# Patient Record
Sex: FEMALE | Race: BLACK OR AFRICAN AMERICAN
[De-identification: names, ages, dates, MRNs, and addresses within clinical notes are randomized per-mention and may not be internally consistent; named-entity substitution may affect disease eponyms.]

---

## 2017-11-18 ENCOUNTER — HOSPITAL ENCOUNTER (EMERGENCY)
Dept: HOSPITAL 24 - ER | Age: 52
Discharge: HOME | End: 2017-11-18
Payer: MEDICAID

## 2017-11-18 VITALS — DIASTOLIC BLOOD PRESSURE: 72 MMHG | SYSTOLIC BLOOD PRESSURE: 166 MMHG

## 2017-11-18 VITALS — BODY MASS INDEX: 34.3 KG/M2

## 2017-11-18 DIAGNOSIS — R10.84: ICD-10-CM

## 2017-11-18 DIAGNOSIS — A08.4: Primary | ICD-10-CM

## 2017-11-18 LAB
ALBUMIN SERPL BCP-MCNC: 3.7 G/DL (ref 3.4–5)
ALP SERPL-CCNC: 114 UNITS/L (ref 46–116)
ALT SERPL W P-5'-P-CCNC: 17 UNITS/L (ref 12–78)
AMYLASE SERPL-CCNC: 88 UNITS/L (ref 25–115)
AST SERPL W P-5'-P-CCNC: 13 UNITS/L (ref 15–37)
BASOPHILS # BLD AUTO: 0.1 X10^3/UL (ref 0–0.1)
BASOPHILS NFR BLD AUTO: 0.7 % (ref 0.2–1)
BUN SERPL-MCNC: 38 MG/DL (ref 7–18)
C PARVUM AG STL QL IA: NEGATIVE
CALCIUM ALBUM COR SERPL-SCNC: (no result) MG/DL (ref 8.5–10.1)
CALCIUM ALBUM COR SERPL-SCNC: 145 MMOL/L (ref 136–145)
CALCIUM SERPL-MCNC: 9.3 MG/DL (ref 8.5–10.1)
CHLORIDE SERPL-SCNC: 106 MMOL/L (ref 98–107)
CO2 SERPL-SCNC: 23.6 MMOL/L (ref 21–32)
CREAT SERPL-MCNC: 1.4 MG/DL (ref 0.55–1.02)
EGFR  BLACK RACES: 50 (ref 60–?)
EOSINOPHIL # BLD AUTO: 0 X10^3/UL (ref 0–0.2)
EOSINOPHIL NFR BLD AUTO: 0.3 % (ref 0.9–2.9)
ERYTHROCYTE [DISTWIDTH] IN BLOOD BY AUTOMATED COUNT: 14.5 % (ref 11.6–16.5)
G LAMBLIA AG STL QL IA: NEGATIVE
HCT VFR BLD AUTO: 44.4 % (ref 36–47)
HGB BLD-MCNC: 14.4 G/DL (ref 12–16)
LIPASE SERPL-CCNC: 264 UNITS/L (ref 73–393)
LYMPHOCYTES # BLD AUTO: 2.3 X10^3/UL (ref 1.3–2.9)
LYMPHOCYTES NFR BLD AUTO: 16.9 % (ref 21–51)
MCH RBC QN AUTO: 25.5 PG (ref 27–34)
MCHC RBC AUTO-ENTMCNC: 32.5 G/DL (ref 33–35)
MCV RBC AUTO: 78.6 FL (ref 80–100)
MONOCYTES # BLD AUTO: 0.6 X10^3/UL (ref 0.3–0.8)
MONOCYTES NFR BLD AUTO: 4.2 % (ref 0–13)
NEUTROPHILS # BLD AUTO: 10.6 X10^3/UL (ref 2.2–4.8)
NEUTROPHILS NFR BLD AUTO: 77.9 % (ref 42–75)
PLAT MORPH BLD: NORMAL
PLATELET # BLD: 195 X10^3/UL (ref 150–450)
PMV BLD AUTO: 10.5 FL (ref 7.4–11)
PROT SERPL-MCNC: 8.1 G/DL (ref 6.4–8.2)
RBC # BLD AUTO: 5.65 X10^6/UL (ref 3.5–5.4)
RBC MORPH BLD: (no result)
RBC MORPH BLD: SLIGHT
SODIUM SERPL-SCNC: 142 MMOL/L (ref 136–145)
STOOL FOR WBC: NEGATIVE
WBC NRBC COR # BLD AUTO: 13.6 X10^3/UL (ref 3.6–10)

## 2017-11-18 PROCEDURE — 96374 THER/PROPH/DIAG INJ IV PUSH: CPT

## 2017-11-18 PROCEDURE — 96375 TX/PRO/DX INJ NEW DRUG ADDON: CPT

## 2017-11-18 PROCEDURE — 87899 AGENT NOS ASSAY W/OPTIC: CPT

## 2017-11-18 PROCEDURE — 82150 ASSAY OF AMYLASE: CPT

## 2017-11-18 PROCEDURE — 74176 CT ABD & PELVIS W/O CONTRAST: CPT

## 2017-11-18 PROCEDURE — 36415 COLL VENOUS BLD VENIPUNCTURE: CPT

## 2017-11-18 PROCEDURE — 96367 TX/PROPH/DG ADDL SEQ IV INF: CPT

## 2017-11-18 PROCEDURE — 83630 LACTOFERRIN FECAL (QUAL): CPT

## 2017-11-18 PROCEDURE — 87336 ENTAMOEB HIST DISPR AG IA: CPT

## 2017-11-18 PROCEDURE — 87328 CRYPTOSPORIDIUM AG IA: CPT

## 2017-11-18 PROCEDURE — 83690 ASSAY OF LIPASE: CPT

## 2017-11-18 PROCEDURE — 99283 EMERGENCY DEPT VISIT LOW MDM: CPT

## 2017-11-18 PROCEDURE — 87427 SHIGA-LIKE TOXIN AG IA: CPT

## 2017-11-18 PROCEDURE — 99282 EMERGENCY DEPT VISIT SF MDM: CPT

## 2017-11-18 PROCEDURE — 80053 COMPREHEN METABOLIC PANEL: CPT

## 2017-11-18 PROCEDURE — 96365 THER/PROPH/DIAG IV INF INIT: CPT

## 2017-11-18 PROCEDURE — 87329 GIARDIA AG IA: CPT

## 2017-11-18 PROCEDURE — 85025 COMPLETE CBC W/AUTO DIFF WBC: CPT

## 2017-11-18 PROCEDURE — 74022 RADEX COMPL AQT ABD SERIES: CPT

## 2017-11-18 PROCEDURE — 87045 FECES CULTURE AEROBIC BACT: CPT

## 2017-11-18 PROCEDURE — 87493 C DIFF AMPLIFIED PROBE: CPT

## 2017-11-18 NOTE — CT
HISTORY: Nausea/vomiting/diarrhea/mid epigastric pain. Prior history of hypertension, diabetes, COPD,
 asthma and cholecystectomy.



Study: CT abdomen and pelvis without IV or oral contrast



Comparison: 05/09/2014.



Technique:



Multiple axial images of the abdomen and pelvis were obtained from the lung bases to the pubic symphy
sis without the administration of IV or oral contrast.  Coronal and sagittal images are also reviewed
. Dose reduction techniques utilized automatic exposure control.



Findings:



The visualized portions of the lung bases are unremarkable.  The liver, spleen, pancreas, kidneys, an
d adrenal glands are unremarkable in their CT appearance. The gallbladder surgically absent..  No sig
nificant mesenteric lymphadenopathy or stranding can be observed.  No free fluid or free air is seen 
within the abdomen.  No bowel wall thickening or bowel dilatation is present. The appendix is normal.
 The colon is unremarkable.  Specifically, there is no diverticulosis noted within the sigmoid colon.
 Uterus and ovaries are not identified and may be surgically absent also. The urinary bladder is zenon
sly unremarkable.  There is again very mild sclerosis along the upper endplate at L4. This is unchang
ed from prior studies.



IMPRESSION:

 

No acute abnormality seen.









Reported By:Electronically Signed by JUANA PEREZ MD at 11/18/2017 2:39:13 PM

## 2017-11-18 NOTE — DR.NAUSEAF
HPI





- Reviewed


Nurses Notes Reviewed: Yes





- Source


History Provided: Parent





- Mode of Arrival


Mode of Arrival: Stretcher





- Context


Onset: Spontaneous


Recent: None


History of: Diabetes





- Quality


Quality: Bilious





- Associated Signs and Symptoms


Abdominal Pain Quality: Cramping


Abdominal Pain Location: Diffuse


Symptoms: Abdominal Pain, Diarrhea, Anorexia





PMH





- PMH


Past Medical History: Asthma, Diabetes, GERD, Hypertension, Sleep Apnea


Past Surgical History: Yes


Surgical History: Hysterectomy





- Family History


Family Medical History: Diabetes Mellitus, Coronary Artery Disease, Hypertension





- Social History


Do you use any recreational Drugs:: No





ROS





- Review of Systems


Constitutional: Weakness, Fatigue, Loss of Appetite.  negative: Chills


Eyes: No Symptoms Reported.  negative: Eye Pain, Discharge


ENTM: Ear Pain, Nose Discharge, Nose Congestion, Throat Pain


Respiratoy: Moist Cough, Wheezing


Cardiovascular: No Symptoms Reported


Gastrointestinal/Abdominal: Vomiting (MAINLY MUCOUS)


Genitourinary: No Symptoms Reported


Neurological: No Symptoms Reported


Musculoskeletal: No Symptoms Reported


Integumentary: No Symptoms Reported


Hematologic/Lymphatic: No Symptoms Reported


Endocrine: No Symptoms Reported


All Other Systems: Reviewed and Negative





PE





- Vital Signs


Vitals: 


 





Temperature                      96.9 F


Pulse Rate [Left Brachial]       55


Pulse Rate                       82


Respiratory Rate                 18


Blood Pressure [Right Arm]       166/72


Blood Pressure [Left Arm]        181/83


Blood Pressure                   146/72


O2 Sat by Pulse Oximetry         100











- General


Limitations: No Limitations


General Appearance: Alert





- Head


Head Exam: Normal Inspection





- Eyes


Eye exam: Normal Appearance





- ENT


ENT Exam: Normal  External Ear Exam





- Neck


Neck Exam: Trachea Midline





- Chest


Chest Inspection: Symmetric Chest Wall Rise





- Respiratory


Respiratory Exam: Normal Lung Sounds Bilat


Respiratory Exam: Bilateral Clear to Auscultation





- Cardiovascular


Cardiovascular Exam: Regular Rate, Normal Rhythm, Normal Heart Sounds





- Abdominal Exam


Abdominal Exam: Normal Bowel Sounds, Soft, Tenderness


Abdominal Tenderness: Diffuse, Moderate





- Rectal


Rectal Exam: Deferred





- 


External  Exam: Female: Deferred


: Speculum Exam  (Female): Deferred


: Bimanual Exam (female): Deferred





- Extremities


Extremities Exam: Normal Inspection





- Back


Back Exam: Normal Inspection





- Neurologic


Neurological Exam: Alert, Oriented X3





- Psychiatric


Psychiatric Exam: Normal Affect, Normal Mood





- Skin


Skin Exam: Normal Color





ROR





- Labs Reviewed


Result Diagrams: 


 11/18/17 10:00





 11/18/17 10:00


Laboratory: 


 





11/18/17 09:53   Stool    - Final





 











WBC  13.6 X10^3/uL (3.6-10.0)  H  11/18/17  10:00    


 


RBC  5.65 X10^6/uL (3.5-5.4)  H  11/18/17  10:00    


 


Hgb  14.4 g/dL (12.0-16.0)   11/18/17  10:00    


 


Hct  44.4 % (36.0-47.0)   11/18/17  10:00    


 


MCV  78.6 fL (80.0-100.0)  L  11/18/17  10:00    


 


MCH  25.5 pg (27.0-34.0)  L  11/18/17  10:00    


 


MCHC  32.5 g/dL (33.0-35.0)  L  11/18/17  10:00    


 


RDW  14.5 % (11.6-16.5)   11/18/17  10:00    


 


Plt Count  195 X10^3/uL (150.0-450.0)   11/18/17  10:00    


 


Plt Count Comment  Adequate  (ADEQUATE)   11/18/17  10:00    


 


MPV  10.5 fL (7.4-11.0)   11/18/17  10:00    


 


Neut %  77.9 % (42.0-75.0)  H  11/18/17  10:00    


 


Lymph %  16.9 % (21.0-51.0)  L  11/18/17  10:00    


 


Mono %  4.2 % (0.0-13.0)   11/18/17  10:00    


 


Eos %  0.3 % (0.9-2.9)  L  11/18/17  10:00    


 


Baso %  0.7 % (0.2-1.0)   11/18/17  10:00    


 


Neut #  10.6 x10^3/uL (2.2-4.8)  H  11/18/17  10:00    


 


Lymph #  2.3 X10^3/uL (1.3-2.9)   11/18/17  10:00    


 


Mono #  0.6 x10^3/uL (0.3-0.8)   11/18/17  10:00    


 


Eos #  0.0 x10^3/uL (0.0-0.2)   11/18/17  10:00    


 


Baso #  0.1 X10^3/uL (0.0-0.1)   11/18/17  10:00    


 


Absolute Nucleated RBC  0.0 /100WBC  11/18/17  10:00    


 


Plt Morphology Comment  Normal  (NORMAL)   11/18/17  10:00    


 


RBC Morphology  Abnormal  (NORMAL)  A  11/18/17  10:00    


 


Microcytosis  Slight  A  11/18/17  10:00    


 


Sodium  142 mmol/L (136-145)   11/18/17  10:00    


 


Corrected Sodium  145 mmol/L (136-145)   11/18/17  10:00    


 


Potassium  3.2 mmol/L (3.5-5.1)  L  11/18/17  10:00    


 


Chloride  106 mmol/L ()   11/18/17  10:00    


 


Carbon Dioxide  23.6 mmol/L (21-32)   11/18/17  10:00    


 


BUN  38 mg/dL (7-18)  H  11/18/17  10:00    


 


Creatinine  1.40 mg/dL (0.55-1.02)  H  11/18/17  10:00    


 


Est GFR (MDRD) Af Amer  50  (>60)  L  11/18/17  10:00    


 


Est GFR (MDRD) Non-Af  42  (>60)  L  11/18/17  10:00    


 


Glucose  219 mg/dL (65-99)  H  11/18/17  10:00    


 


Calcium  9.3 mg/dL (8.5-10.1)   11/18/17  10:00    


 


Corrected Calcium  TNP   11/18/17  10:00    


 


Total Bilirubin  0.50 mg/dL (0.2-1.0)   11/18/17  10:00    


 


AST  13 Units/L (15-37)  L  11/18/17  10:00    


 


ALT  17 Units/L (12-78)   11/18/17  10:00    


 


Alkaline Phosphatase  114 Units/L ()   11/18/17  10:00    


 


Total Protein  8.1 g/dL (6.4-8.2)   11/18/17  10:00    


 


Albumin  3.7 g/dL (3.4-5.0)   11/18/17  10:00    


 


Globulin  4.4 g/dL (2.5-4.5)   11/18/17  10:00    


 


Albumin/Globulin Ratio  0.8 Ratio (1.1-2.1)  L  11/18/17  10:00    


 


Amylase  88 Units/L ()   11/18/17  10:00    


 


Lipase  264 Units/L ()   11/18/17  10:00    


 


Stool Description  4g,loose,yellow   11/18/17  09:53    


 


Stool for White Cells  Negative  (NEGATIVE)   11/18/17  09:53    


 


Stl C. diff Tox B Gene  Negative  (NEGATIVE)   11/18/17  09:53    


 


Stl C. diff 027-NAP1-BI  Negative  (NEGATIVE)   11/18/17  09:53    


 


Cryptosporid parvum Ag  Negative  (NEGATIVE)   11/18/17  09:53    


 


E. histolytica Antigen  Negative  (NEGATIVE)   11/18/17  09:53    


 


Giardia lamblia Ag  Negative  (NEGATIVE)   11/18/17  09:53    














- Discharge Plan


Disposition: 01 HOME, SELF-CARE


Condition: Stable


Prescriptions: 


Diphenoxylate/Atropine [Lomotil] 1 tab PO TID PRN #21 tab


 PRN Reason: 


Ondansetron [Zofran ODT 8 mg] 8 mg PO Q8H PRN #12 tab


 PRN Reason: Nausea/Vomiting


Promethazine HCl [PHENERGAN TAB 25 MG *] 25 mg PO Q8H PRN #12 tab


 PRN Reason: Nausea/Vomiting





- Follow ups/Referrals


Follow ups/Referrals: 


[facility],None [Primary Care Provider] - 11/20/17





- Instructions


Instructions:  Viral Gastroenteritis, Adult, Easy-to-Read, Abdominal Pain, Adult

, Easy-to-Read


Additional Instructions: 


RETURN TO ED IF WORSE.

## 2017-11-18 NOTE — RAD
Examination: Abdomen series with PA chest



History: Nausea and vomiting, diabetes



Comparison reference: 06/14/2015



Findings: PA chest: Normal heart size with clear lungs and pleural spaces. There is no evidence for p
leural fluid or pneumoperitoneum. There are surgical clips in the neck. In the abdomen, supine and up
right views demonstrate scattered segments of gas and fluid distended small bowel and colon. A few sh
ort air-fluid levels are seen. There is no evidence for ascites, mass or pneumoperitoneum. There are 
surgical clips in the right upper abdomen.



Impression:



1. Normal PA chest.



2. Intestinal gas pattern most consistent with a reflex ileus. However, follow-up imaging recommended
 if developing intestinal ischemia or obstruction is a clinical concern.



Reported By:Electronically Signed by CLARKE MALAGON MD at 11/18/2017 12:50:47 PM

## 2017-12-21 ENCOUNTER — HOSPITAL ENCOUNTER (EMERGENCY)
Dept: HOSPITAL 24 - ER | Age: 52
LOS: 1 days | Discharge: HOME | End: 2017-12-22
Payer: MEDICAID

## 2017-12-21 VITALS — BODY MASS INDEX: 41.4 KG/M2

## 2017-12-21 DIAGNOSIS — R11.0: ICD-10-CM

## 2017-12-21 DIAGNOSIS — R10.84: ICD-10-CM

## 2017-12-21 DIAGNOSIS — K52.89: Primary | ICD-10-CM

## 2017-12-21 LAB
ALBUMIN SERPL BCP-MCNC: 3.8 G/DL (ref 3.4–5)
ALP SERPL-CCNC: 122 UNITS/L (ref 46–116)
ALT SERPL W P-5'-P-CCNC: 24 UNITS/L (ref 12–78)
AMYLASE SERPL-CCNC: 61 UNITS/L (ref 25–115)
AST SERPL W P-5'-P-CCNC: 18 UNITS/L (ref 15–37)
BASOPHILS # BLD AUTO: 0.1 X10^3/UL (ref 0–0.1)
BASOPHILS NFR BLD AUTO: 0.7 % (ref 0.2–1)
BUN SERPL-MCNC: 20 MG/DL (ref 7–18)
C PARVUM AG STL QL IA: NEGATIVE
CALCIUM ALBUM COR SERPL-SCNC: (no result) MG/DL (ref 8.5–10.1)
CALCIUM ALBUM COR SERPL-SCNC: 144 MMOL/L (ref 136–145)
CALCIUM SERPL-MCNC: 9 MG/DL (ref 8.5–10.1)
CHLORIDE SERPL-SCNC: 107 MMOL/L (ref 98–107)
CO2 SERPL-SCNC: 24.4 MMOL/L (ref 21–32)
CREAT SERPL-MCNC: 1.05 MG/DL (ref 0.55–1.02)
EGFR  BLACK RACES: > 60 (ref 60–?)
EOSINOPHIL # BLD AUTO: 0.1 X10^3/UL (ref 0–0.2)
EOSINOPHIL NFR BLD AUTO: 0.5 % (ref 0.9–2.9)
ERYTHROCYTE [DISTWIDTH] IN BLOOD BY AUTOMATED COUNT: 14.5 % (ref 11.6–16.5)
G LAMBLIA AG STL QL IA: NEGATIVE
HCT VFR BLD AUTO: 45.7 % (ref 36–47)
HGB BLD-MCNC: 14.8 G/DL (ref 12–16)
LIPASE SERPL-CCNC: 129 UNITS/L (ref 73–393)
LYMPHOCYTES # BLD AUTO: 2.9 X10^3/UL (ref 1.3–2.9)
LYMPHOCYTES NFR BLD AUTO: 20.2 % (ref 21–51)
MCH RBC QN AUTO: 25.6 PG (ref 27–34)
MCHC RBC AUTO-ENTMCNC: 32.4 G/DL (ref 33–35)
MCV RBC AUTO: 79 FL (ref 80–100)
MONOCYTES # BLD AUTO: 0.6 X10^3/UL (ref 0.3–0.8)
MONOCYTES NFR BLD AUTO: 4.3 % (ref 0–13)
NEUTROPHILS # BLD AUTO: 10.8 X10^3/UL (ref 2.2–4.8)
NEUTROPHILS NFR BLD AUTO: 74.3 % (ref 42–75)
PLAT MORPH BLD: NORMAL
PLATELET # BLD: 261 X10^3/UL (ref 150–450)
PMV BLD AUTO: 10 FL (ref 7.4–11)
PROT SERPL-MCNC: 8.4 G/DL (ref 6.4–8.2)
RBC # BLD AUTO: 5.78 X10^6/UL (ref 3.5–5.4)
RBC MORPH BLD: (no result)
RBC MORPH BLD: PRESENT
RBC MORPH BLD: SLIGHT
RBC MORPH BLD: SLIGHT
SODIUM SERPL-SCNC: 143 MMOL/L (ref 136–145)
STOOL FOR WBC: NEGATIVE
WBC NRBC COR # BLD AUTO: 14.5 X10^3/UL (ref 3.6–10)

## 2017-12-21 PROCEDURE — 83630 LACTOFERRIN FECAL (QUAL): CPT

## 2017-12-21 PROCEDURE — 99283 EMERGENCY DEPT VISIT LOW MDM: CPT

## 2017-12-21 PROCEDURE — 96374 THER/PROPH/DIAG INJ IV PUSH: CPT

## 2017-12-21 PROCEDURE — 96365 THER/PROPH/DIAG IV INF INIT: CPT

## 2017-12-21 PROCEDURE — 36415 COLL VENOUS BLD VENIPUNCTURE: CPT

## 2017-12-21 PROCEDURE — 81001 URINALYSIS AUTO W/SCOPE: CPT

## 2017-12-21 PROCEDURE — 87899 AGENT NOS ASSAY W/OPTIC: CPT

## 2017-12-21 PROCEDURE — 85025 COMPLETE CBC W/AUTO DIFF WBC: CPT

## 2017-12-21 PROCEDURE — 87045 FECES CULTURE AEROBIC BACT: CPT

## 2017-12-21 PROCEDURE — 96367 TX/PROPH/DG ADDL SEQ IV INF: CPT

## 2017-12-21 PROCEDURE — 80053 COMPREHEN METABOLIC PANEL: CPT

## 2017-12-21 PROCEDURE — 74177 CT ABD & PELVIS W/CONTRAST: CPT

## 2017-12-21 PROCEDURE — 96375 TX/PRO/DX INJ NEW DRUG ADDON: CPT

## 2017-12-21 PROCEDURE — 87328 CRYPTOSPORIDIUM AG IA: CPT

## 2017-12-21 PROCEDURE — 74022 RADEX COMPL AQT ABD SERIES: CPT

## 2017-12-21 PROCEDURE — 82150 ASSAY OF AMYLASE: CPT

## 2017-12-21 PROCEDURE — 83690 ASSAY OF LIPASE: CPT

## 2017-12-21 PROCEDURE — 87336 ENTAMOEB HIST DISPR AG IA: CPT

## 2017-12-21 PROCEDURE — 87329 GIARDIA AG IA: CPT

## 2017-12-21 PROCEDURE — 99282 EMERGENCY DEPT VISIT SF MDM: CPT

## 2017-12-21 PROCEDURE — 87493 C DIFF AMPLIFIED PROBE: CPT

## 2017-12-21 PROCEDURE — 87427 SHIGA-LIKE TOXIN AG IA: CPT

## 2017-12-21 NOTE — RAD
ACUTE ABDOMINAL SERIES     



CLINICAL HISTORY: 52-year-old female with nausea, vomiting and diarrhea since Tuesday with right flan
k pain. Patient recently had EGD and colonoscopy.



COMPARISON: None.



FINDINGS: 



PA chest radiograph demonstrates normal cardiopericardial silhouette. There is no focal consolidation
, pleural effusion or pneumothorax. Pulmonary vascularity is normal.



Abdominal radiographs demonstrate multiple dilated air-filled loops of small bowel within the left up
per quadrant measuring up to 5.7 cm with air-fluid levels on upright imaging. Gas and stool are seen 
throughout the colon. There is no small bowel distention. There is no radiographic evidence of pneumo
peritoneum.



Imaged osseous structures are intact. Soft tissues are unremarkable.



IMPRESSION: 



1. No acute cardiopulmonary process.

2. Findings concerning for small bowel obstruction, CT abdomen and pelvis with contrast is recommende
d for complete evaluation.





Reported By:Electronically Signed by JERED CARTER MD at 12/21/2017 10:12:19 PM

## 2017-12-21 NOTE — DR.GENAD
HPI





- PCP


Primary Care Physician: aury





- HPI Comment


HPI Comment: PATIENT HAD EGD AND COLONOSCOPY DONE RECENTLY. WAS GIVEN CARAFATE 

AND CHOLESTRAMINE WHICH IS MAKING HER FEEL SICK TO HER STOMACH. PAIN IN RT 

FLANK AREA AS WELL. HAVING WATERY DIARRHEA. NO FEVER.





- Complaint/Symptoms


Chief Complaint Doctors Comments: N/V/D AND ANDOMINAL AND LOWER RIB PAIN TIMES 

SEVERAL DAYS.


Chief Complaint:: n/v/d since tuesday, rt flank pain since last night. i had a 

egd and colonoscopy done day before thanksgiving and the monday after and they 

gave meds to take said something with wrong with the layer of my colon. the 

meds are just making me sicker. i need something for this throwing up. 

everytime i take a breath my ribs are killling me bc of all this throwing up.


Self Treatment fo Chief Complaint: carafate susp 1 gram/10cc 1 tsp 30 mins 

before meals and cholestryramine susp





- Nurses notes reviewed


Nurses Notes Review: Yes





- Source


History Provided: Patient





- Mode of Arrival


Mode of Arrival: Ambulatory





- Timing


Onset of Chief Complaint: 12/19/17


Came on: Gradually





- Duration


Duration: Constant


Duration: Days





- Severity


Severity: Moderate





PMH





- PMH


Past Medical History: Yes


Past Medical History: Asthma, Diabetes, GERD, Hypertension, Sleep Apnea


Past Surgical History: Yes


Surgical History: Hysterectomy





- Family History


History of Family Medical Conditions: Yes


Family Medical History: Diabetes Mellitus, Coronary Artery Disease, Hypertension





- Social History


Does patient currently use any type of tobacco product: No


Have you used tobacco products in the last 12 months: No


Type of Tobacco Use: None


Does any household member use tobacco: No


Alcohol Use: None


Do you use any recreational Drugs:: No


Lives With: Family


Lives Where: Home





- infectious screening


Have you traveled outside the country in the last 6 months?: No


Isolation: Standard





ROS





- Review of Systems


Constitutional: Weakness, Fatigue, Loss of Appetite.  negative: Chills, 

Diaphoresis


Eyes: No Symptoms Reported.  negative: Eye Pain, Discharge


ENTM: No Symptoms Reported.  negative: Ear Pain, Nose Discharge, Nose Congestion

, Throat Pain


Respiratoy: No Symptoms Reported.  negative: Productive Cough, Short of Breath, 

Wheezing, Hemoptysis


Cardiovascular: No Symptoms Reported


Gastrointestinal/Abdominal: Abdominal Pain, Diarrhea, Nausea, Vomiting


Genitourinary: No Symptoms Reported


Neurological: Weakness, Dizziness


Musculoskeletal: Muscle Pain


Integumentary: No Symptoms Reported


Hematologic/Lymphatic: No Symptoms Reported


Endocrine: No Symptoms Reported


All Other Systems: Reviewed and Negative





PE





- Vital Signs


Vitals: 


 





Temperature                      97.1 F


Pulse Rate [Right Brachial]      72


Pulse Rate                       92


Respiratory Rate                 18


Blood Pressure [Right Arm]       128/65


Blood Pressure [Left Arm]        181/83


Blood Pressure                   175/90


O2 Sat by Pulse Oximetry         98











- General


Limitations: No Limitations


General Appearance: Alert





- Head


Head Exam: Normal Inspection





- Eyes


Eye exam: Normal Appearance





- ENT


ENT Exam: Normal  External Ear Exam


External Ear Exam: Normal External Inspection


TM/Canal Exam: Bilateral Normal


Nose Exam: Normal Nose Exam


Mouth Exam: Normal Inspection


Throat Exam: Normal Inspection





- Neck


Neck Exam: Trachea Midline





- Chest


Chest Inspection: Symmetric Chest Wall Rise





- Respiratory


Respiratory Exam: Normal Lung Sounds Bilat


Respiratory Exam: Bilateral Rhonchi, Lower Rhonchi





- Cardiovascular


Cardiovascular Exam: Regular Rate, Normal Rhythm, Normal Heart Sounds





- Abdominal Exam


Abdominal Exam: Normal Bowel Sounds, Soft, Tenderness


Abdominal Tenderness: Diffuse, Moderate





- Extremities


Extremities Exam: Normal Inspection





- Back


Back Exam: Normal Inspection





- Neurologic


Neurological Exam: Alert, Oriented X3





- Psychiatric


Psychiatric Exam: Anxious





- Skin


Skin Exam: Normal Color





MDM





- Differential Diagnosis


Differential Diagnosis: ABD PAIN, BOWEL OBTRUCTION, UTI, KIDNEY STONE





Course





- Treatment


Treatment: SEE ORDERS.





- Education/Counseling


Education/Counseling: Patient, Education


Educated On: Diagnosis, Needs for Follow Up





ROR





- Labs Reviewed


Laboratory Results Reviewed?: Yes


Result Diagrams: 


 12/21/17 20:16





 12/21/17 20:16


Laboratory: 


 





12/21/17 20:34   Stool   Stool Culture - Final


12/21/17 20:34   Stool    - Final





 











WBC  14.5 X10^3/uL (3.6-10.0)  H  12/21/17  20:16    


 


RBC  5.78 X10^6/uL (3.5-5.4)  H  12/21/17  20:16    


 


Hgb  14.8 g/dL (12.0-16.0)   12/21/17  20:16    


 


Hct  45.7 % (36.0-47.0)   12/21/17  20:16    


 


MCV  79.0 fL (80.0-100.0)  L  12/21/17  20:16    


 


MCH  25.6 pg (27.0-34.0)  L  12/21/17  20:16    


 


MCHC  32.4 g/dL (33.0-35.0)  L  12/21/17  20:16    


 


RDW  14.5 % (11.6-16.5)   12/21/17  20:16    


 


Plt Count  261 X10^3/uL (150.0-450.0)   12/21/17  20:16    


 


Plt Count Comment  Adequate  (ADEQUATE)   12/21/17  20:16    


 


MPV  10.0 fL (7.4-11.0)   12/21/17  20:16    


 


Neut %  74.3 % (42.0-75.0)   12/21/17  20:16    


 


Lymph %  20.2 % (21.0-51.0)  L  12/21/17  20:16    


 


Mono %  4.3 % (0.0-13.0)   12/21/17  20:16    


 


Eos %  0.5 % (0.9-2.9)  L  12/21/17  20:16    


 


Baso %  0.7 % (0.2-1.0)   12/21/17  20:16    


 


Neut #  10.8 x10^3/uL (2.2-4.8)  H  12/21/17  20:16    


 


Lymph #  2.9 X10^3/uL (1.3-2.9)   12/21/17  20:16    


 


Mono #  0.6 x10^3/uL (0.3-0.8)   12/21/17  20:16    


 


Eos #  0.1 x10^3/uL (0.0-0.2)   12/21/17  20:16    


 


Baso #  0.1 X10^3/uL (0.0-0.1)   12/21/17  20:16    


 


Absolute Nucleated RBC  0.0 /100WBC  12/21/17  20:16    


 


Plt Morphology Comment  Normal  (NORMAL)   12/21/17  20:16    


 


RBC Morphology  Abnormal  (NORMAL)  A  12/21/17  20:16    


 


Hypochromasia  Slight  A  12/21/17  20:16    


 


Poikilocytosis  Slight  A  12/21/17  20:16    


 


Darline Cells  Present   12/21/17  20:16    


 


Sodium  143 mmol/L (136-145)   12/21/17  20:16    


 


Corrected Sodium  144 mmol/L (136-145)   12/21/17  20:16    


 


Potassium  3.2 mmol/L (3.5-5.1)  L  12/21/17  20:16    


 


Chloride  107 mmol/L ()   12/21/17  20:16    


 


Carbon Dioxide  24.4 mmol/L (21-32)   12/21/17  20:16    


 


BUN  20 mg/dL (7-18)  H  12/21/17  20:16    


 


Creatinine  1.05 mg/dL (0.55-1.02)  H  12/21/17  20:16    


 


Est GFR (MDRD) Af Amer  > 60  (>60)   12/21/17  20:16    


 


Est GFR (MDRD) Non-Af  58  (>60)  L  12/21/17  20:16    


 


Glucose  121 mg/dL (65-99)  H  12/21/17  20:16    


 


Calcium  9.0 mg/dL (8.5-10.1)   12/21/17  20:16    


 


Corrected Calcium  TNP   12/21/17  20:16    


 


Total Bilirubin  0.40 mg/dL (0.2-1.0)   12/21/17  20:16    


 


AST  18 Units/L (15-37)   12/21/17  20:16    


 


ALT  24 Units/L (12-78)   12/21/17  20:16    


 


Alkaline Phosphatase  122 Units/L ()  H  12/21/17  20:16    


 


Total Protein  8.4 g/dL (6.4-8.2)  H  12/21/17  20:16    


 


Albumin  3.8 g/dL (3.4-5.0)   12/21/17  20:16    


 


Globulin  4.6 g/dL (2.5-4.5)  H  12/21/17  20:16    


 


Albumin/Globulin Ratio  0.8 Ratio (1.1-2.1)  L  12/21/17  20:16    


 


Amylase  61 Units/L ()   12/21/17  20:16    


 


Lipase  129 Units/L ()   12/21/17  20:16    


 


Specimen Type  Clean catch urine   12/22/17  01:20    


 


Urine Color  Yellow  (YELLOW)   12/22/17  01:20    


 


Urine Appearance  Clear  (CLEAR)   12/22/17  01:20    


 


Urine pH  5.0  (5.0 - 8.0)   12/22/17  01:20    


 


Ur Specific Gravity  1.015  (1.000-1.030)   12/22/17  01:20    


 


Urine Protein  2+  (NEGATIVE)   12/22/17  01:20    


 


Urine Glucose (UA)  Negative  (NEGATIVE)   12/22/17  01:20    


 


Urine Ketones  Negative  (NEGATIVE)   12/22/17  01:20    


 


Urine Occult Blood  Negative  (NEGATIVE)   12/22/17  01:20    


 


Urine Nitrite  Negative  (NEGATIVE)   12/22/17  01:20    


 


Urine Bilirubin  Negative  (NEGATIVE)   12/22/17  01:20    


 


Urine Urobilinogen  Normal  (NORMAL)   12/22/17  01:20    


 


Ur Leukocyte Esterase  Negative  (NEGATIVE)   12/22/17  01:20    


 


Urine RBC  Rare /HPF (NEGATIVE)   12/22/17  01:20    


 


Urine WBC  None seen /HPF (NEGATIVE)   12/22/17  01:20    


 


Ur Squamous Epith Cells  Rare /HPF (NEGATIVE)   12/22/17  01:20    


 


Urine Bacteria  Negative /HPF (NEGATIVE)   12/22/17  01:20    


 


Hyaline Casts  Few /LPF (NEGATIVE)   12/22/17  01:20    


 


Urine Mucus  Few /HPF (NEGATIVE)   12/22/17  01:20    


 


Ur Culture Indicated?  No/not indicated   12/22/17  01:20    


 


Stool Description  75 g. tan/liquid   12/21/17  20:34    


 


Stool for White Cells  Negative  (NEGATIVE)   12/21/17  20:46    


 


Stl C. diff Tox B Gene  Negative  (NEGATIVE)   12/21/17  20:46    


 


Stl C. diff 027-NAP1-BI  Negative  (NEGATIVE)   12/21/17  20:46    


 


Cryptosporid parvum Ag  Negative  (NEGATIVE)   12/21/17  20:34    


 


E. histolytica Antigen  Negative  (NEGATIVE)   12/21/17  20:34    


 


Giardia lamblia Ag  Negative  (NEGATIVE)   12/21/17  20:34    














- XRAY


XRAY Interpreted by: Radiologist


XRAY Findings: REPORT DISCUSS WITH PATIENT.





- Diagnosis


Discharge Problem: 


 Gastroenteritis, Nausea





Abdominal pain


Qualifiers:


 Abdominal location: generalized Qualified Code(s): R10.84 - Generalized 

abdominal pain








- Discharge Plan


Disposition: 01 HOME, SELF-CARE


Condition: Stable


Prescriptions: 


Ciprofloxacin HCl [CIPRO 500 MG TAB *] 500 mg PO Q12H #20 tab


Ondansetron [Zofran ODT 8 mg] 8 mg PO Q8H PRN #15 tab


 PRN Reason: Nausea/Vomiting





- Follow ups/Referrals


Follow ups/Referrals: 


[facility],None [Primary Care Provider] - 3 days





- Instructions


Instructions:  Nausea, Adult, Viral Gastroenteritis, Adult, Easy-to-Read, 

Abdominal Pain, Adult, Easy-to-Read

## 2017-12-22 VITALS — DIASTOLIC BLOOD PRESSURE: 65 MMHG | SYSTOLIC BLOOD PRESSURE: 128 MMHG

## 2017-12-22 LAB
BACTERIA URNS QL MICRO: NEGATIVE /HPF
BILIRUB UR QL STRIP.AUTO: NEGATIVE
CLARITY UR: CLEAR
COLOR UR AUTO: YELLOW
GLUCOSE UR QL STRIP.AUTO: NEGATIVE
HYALINE CASTS URNS QL MICRO: (no result) /LPF
KETONES UR QL STRIP.AUTO: NEGATIVE
LEUKOCYTE ESTERASE UR QL STRIP.AUTO: NEGATIVE
MUCOUS THREADS #/AREA URNS HPF: (no result) /HPF
NITRITE UR QL STRIP.AUTO: NEGATIVE
PH UR STRIP.AUTO: 5 [PH] (ref 5–8)
PROT UR QL STRIP.AUTO: (no result)
RBC # UR STRIP.AUTO: NEGATIVE /UL
RBC #/AREA URNS HPF: (no result) /HPF
SP GR UR STRIP.AUTO: 1.01 (ref 1–1.03)
SQUAMOUS URNS QL MICRO: (no result) /HPF
UROBILINOGEN UR QL STRIP.AUTO: NORMAL

## 2017-12-22 NOTE — CT
CT abdomen and pelvis with contrast



Indication: Nausea and vomiting with right flank pain



Comparison: 11/18/17



Technique:



Multiple axial images of the abdomen and pelvis were obtained from the lung bases to the pubic symphy
sis after the administration of IV contrast.  



Findings:

Lung bases are clear. The liver demonstrates focal hypoattenuation/decreased enhancement within the m
edial aspect of the anterior pack segment adjacent to gallbladder fossa on axial image 27 similar kaylie
or examination dated 05/09/2014. No new hepatic lesion identified. Previous cholecystectomy is noted.
 Bile ducts are normal in caliber. The spleen, pancreas and adrenal glands are normal. Neither kidney
 demonstrates evidence of nephrolithiasis, hydronephrosis or mass. Upper GI tract demonstrates mild f
luid distention and mucosal enhancement within proximal mid loops of small bowel. There is no dilatat
ion or distal decompression identified to suggest obstruction. Urinary bladder is normal. No pelvic o
r adnexal mass. The rectum and colon demonstrate mild distal colonic diverticulosis without evidence 
acute diverticulitis. Mild increased fluid distention of the proximal colon. The appendix is normal. 
Abdominal aorta is normal in caliber. No enlarged abdominal or pelvic lymphadenopathy. Review of bone
 windows demonstrates no acute osseous abnormality.



Impression: 

1.Mild increased fluid distention and mucosal enhancement of the proximal mid small bowel with increa
sed fluid within the proximal colon most consistent with an acute enteritis and likely associated nikky
rrhea.



2. The appendix is normal.



3. Stable incidental findings as described above.







Reported By:Electronically Signed by PAULIE SEARS MD at 12/22/2017 1:33:48 AM

## 2018-01-20 ENCOUNTER — HOSPITAL ENCOUNTER (EMERGENCY)
Dept: HOSPITAL 24 - ER | Age: 53
Discharge: HOME | End: 2018-01-20
Payer: MEDICAID

## 2018-01-20 VITALS — SYSTOLIC BLOOD PRESSURE: 160 MMHG | DIASTOLIC BLOOD PRESSURE: 72 MMHG

## 2018-01-20 VITALS — BODY MASS INDEX: 43.4 KG/M2
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
## 2018-01-20 DIAGNOSIS — S70.12XA: Primary | ICD-10-CM
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
## 2018-01-20 LAB
BASOPHILS # BLD AUTO: 0.1 X10^3/UL (ref 0–0.1)
BASOPHILS NFR BLD AUTO: 1.5 % (ref 0.2–1)
BUN SERPL-MCNC: 19 MG/DL (ref 7–18)
CALCIUM ALBUM COR SERPL-SCNC: 144 MMOL/L (ref 136–145)
CALCIUM SERPL-MCNC: 9.1 MG/DL (ref 8.5–10.1)
CHLORIDE SERPL-SCNC: 108 MMOL/L (ref 98–107)
CO2 SERPL-SCNC: 24.9 MMOL/L (ref 21–32)
CREAT SERPL-MCNC: 0.74 MG/DL (ref 0.55–1.02)
EGFR  BLACK RACES: > 60 (ref 60–?)
EOSINOPHIL # BLD AUTO: 0.2 X10^3/UL (ref 0–0.2)
EOSINOPHIL NFR BLD AUTO: 1.8 % (ref 0.9–2.9)
ERYTHROCYTE [DISTWIDTH] IN BLOOD BY AUTOMATED COUNT: 14.8 % (ref 11.6–16.5)
HCT VFR BLD AUTO: 38.8 % (ref 36–47)
HGB BLD-MCNC: 12.8 G/DL (ref 12–16)
LYMPHOCYTES # BLD AUTO: 2.5 X10^3/UL (ref 1.3–2.9)
LYMPHOCYTES NFR BLD AUTO: 27.4 % (ref 21–51)
MCH RBC QN AUTO: 26.3 PG (ref 27–34)
MCHC RBC AUTO-ENTMCNC: 33 G/DL (ref 33–35)
MCV RBC AUTO: 79.7 FL (ref 80–100)
MONOCYTES # BLD AUTO: 0.4 X10^3/UL (ref 0.3–0.8)
MONOCYTES NFR BLD AUTO: 4.8 % (ref 0–13)
NEUTROPHILS # BLD AUTO: 5.9 X10^3/UL (ref 2.2–4.8)
NEUTROPHILS NFR BLD AUTO: 64.5 % (ref 42–75)
PLATELET # BLD: 215 X10^3/UL (ref 150–450)
PMV BLD AUTO: 10.3 FL (ref 7.4–11)
RBC # BLD AUTO: 4.87 X10^6/UL (ref 3.5–5.4)
SODIUM SERPL-SCNC: 143 MMOL/L (ref 136–145)
WBC NRBC COR # BLD AUTO: 9.2 X10^3/UL (ref 3.6–10)

## 2018-01-20 PROCEDURE — 99282 EMERGENCY DEPT VISIT SF MDM: CPT

## 2018-01-20 PROCEDURE — 80048 BASIC METABOLIC PNL TOTAL CA: CPT

## 2018-01-20 PROCEDURE — 85378 FIBRIN DEGRADE SEMIQUANT: CPT

## 2018-01-20 PROCEDURE — 96372 THER/PROPH/DIAG INJ SC/IM: CPT

## 2018-01-20 PROCEDURE — 36415 COLL VENOUS BLD VENIPUNCTURE: CPT

## 2018-01-20 PROCEDURE — 85025 COMPLETE CBC W/AUTO DIFF WBC: CPT

## 2018-01-20 PROCEDURE — 93971 EXTREMITY STUDY: CPT

## 2018-01-20 PROCEDURE — 99283 EMERGENCY DEPT VISIT LOW MDM: CPT

## 2018-01-20 NOTE — VAS
HISTORY: Left lower extremity pain status post trauma



Study:  Left lower extremity venous Doppler ultrasound



Comparison: 03/20/2016



TECHNIQUE:  Multiple gray scale and color flow Doppler images of the deep venous system were obtained
 of the left lower extremity.



FINDINGS:  



The deep venous system of the left lower extremity was evaluated from the level of the common femoral
 vein through the popliteal vein.  Normal color flow and augmentation can be observed.  In addition, 
normal compression is seen throughout the deep venous system.

 

IMPRESSION: 

 

1.  Negative for DVT.





Reported By:Electronically Signed by SARA SCOTT MD at 1/20/2018 1:25:49 PM

## 2018-01-20 NOTE — DR.GENAD
HPI





- PCP


Primary Care Physician: NFD





- Complaint/Symptoms


Chief Complaint Doctors Comments: Patient presents to the ED with complaint of 

hitting her left mid thigh against the door a couple of days ago.  Today her 

leg is hurting.


Chief Complaint:: "Last week I ran into a door and hit my left leg. Now it is 

hurting"





- Source


History Provided: Patient





- Mode of Arrival


Mode of Arrival: Ambulatory





- Timing


Onset of Chief Complaint: 01/13/18





PMH





- PMH


Past Medical History: Yes


Past Medical History: Asthma, Diabetes, GERD, Hypertension, Sleep Apnea


Past Surgical History: Yes


Surgical History: Hysterectomy





- Family History


History of Family Medical Conditions: Yes


Family Medical History: Diabetes Mellitus, Coronary Artery Disease, Hypertension





- Social History


Does patient currently use any type of tobacco product: No


Have you used tobacco products in the last 12 months: No


Type of Tobacco Use: None


Does any household member use tobacco: No


Alcohol Use: None


Do you use any recreational Drugs:: No


Lives With: Alone


Lives Where: Home





- infectious screening


In the last 2 months have you had wt loss of >10#?: NO


Have you had fever, night sweats or hemotysis?: No


Have you traveled outside the country in the last 6 months?: No


Isolation: Standard





ROS





- Review of Systems


Constitutional: No Symptoms Reported


Eyes: No Symptoms Reported


ENTM: No Symptoms Reported


Respiratoy: No Symptoms Reported


Cardiovascular: No Symptoms Reported


Gastrointestinal/Abdominal: No Symptoms Reported


Genitourinary: No Symptoms Reported


Neurological: No Symptoms Reported


Musculoskeletal: No Symptoms Reported, Left (horacio thigh)


Integumentary: No Symptoms Reported


Hematologic/Lymphatic: No Symptoms Reported


Endocrine: No Symptoms Reported


Psychiatric: No Symptoms Reported


All Other Systems: Reviewed and Negative





PE





- Vital Signs


Vitals: 


 





Temperature                      97.7 F


Pulse Rate                       82


Respiratory Rate                 18


Blood Pressure [Right Arm]       128/65


Blood Pressure [Left Arm]        181/83


Blood Pressure                   160/72


O2 Sat by Pulse Oximetry         100











- General


Limitations: No Limitations


General Appearance: Alert, In No Apparent Distress





- Head


Head Exam: Normal Inspection, Atraumatic





- Eyes


Eye exam: Normal Appearance, PERRL, EOMI





- ENT


ENT Exam: Normal Exam, Normal Oropharynx


External Ear Exam: Normal External Inspection


TM/Canal Exam: Bilateral Normal


Nose Exam: Normal Nose Exam


Mouth Exam: Normal Inspection


Throat Exam: Normal Inspection





- Neck


Neck Exam: Normal Inspection, Full ROM





- Chest


Chest Inspection: Normal Inspection, Symmetric Chest Wall Rise





- Respiratory


Respiratory Exam: Normal Lung Sounds Bilat


Respiratory Exam: Bilateral Clear to Auscultation





- Cardiovascular


Cardiovascular Exam: Regular Rate, Normal Rhythm





- Abdominal Exam


Abdominal Exam: Normal Inspection, Normal Bowel Sounds


Abdominal Tenderness: negative: RUQ, RLQ, LUQ, LLQ, Epigastrium, Suprapubic, 

Diffuse, Mild, Moderate, Severe, Other





- Extremities


Extremities Exam: Normal Inspection, Tenderness (admits to tenderness mid 

lateral left thigh).  negative: Edema





- Neurologic


Neurological Exam: Alert, Oriented X3, CN II-XII Intact





- Psychiatric


Psychiatric Exam: Normal Affect, Normal Mood





- Skin


Skin Exam: Warm, Dry, Intact





ROR





- Labs Reviewed


Laboratory Results Reviewed?: Yes (D Dimer: [id number])


Result Diagrams: 


 01/20/18 10:25





 01/20/18 10:25


Laboratory: 


 











WBC  9.2 X10^3/uL (3.6-10.0)   01/20/18  10:25    


 


RBC  4.87 X10^6/uL (3.5-5.4)   01/20/18  10:25    


 


Hgb  12.8 g/dL (12.0-16.0)   01/20/18  10:25    


 


Hct  38.8 % (36.0-47.0)   01/20/18  10:25    


 


MCV  79.7 fL (80.0-100.0)  L  01/20/18  10:25    


 


MCH  26.3 pg (27.0-34.0)  L  01/20/18  10:25    


 


MCHC  33.0 g/dL (33.0-35.0)   01/20/18  10:25    


 


RDW  14.8 % (11.6-16.5)   01/20/18  10:25    


 


Plt Count  215 X10^3/uL (150.0-450.0)   01/20/18  10:25    


 


MPV  10.3 fL (7.4-11.0)   01/20/18  10:25    


 


Neut %  64.5 % (42.0-75.0)   01/20/18  10:25    


 


Lymph %  27.4 % (21.0-51.0)   01/20/18  10:25    


 


Mono %  4.8 % (0.0-13.0)   01/20/18  10:25    


 


Eos %  1.8 % (0.9-2.9)   01/20/18  10:25    


 


Baso %  1.5 % (0.2-1.0)  H  01/20/18  10:25    


 


Neut #  5.9 x10^3/uL (2.2-4.8)  H  01/20/18  10:25    


 


Lymph #  2.5 X10^3/uL (1.3-2.9)   01/20/18  10:25    


 


Mono #  0.4 x10^3/uL (0.3-0.8)   01/20/18  10:25    


 


Eos #  0.2 x10^3/uL (0.0-0.2)   01/20/18  10:25    


 


Baso #  0.1 X10^3/uL (0.0-0.1)   01/20/18  10:25    


 


Absolute Nucleated RBC  0.1 /100WBC  01/20/18  10:25    


 


D-Dimer  751 ng/mL (0-400)  H*  01/20/18  10:25    


 


Sodium  143 mmol/L (136-145)   01/20/18  10:25    


 


Corrected Sodium  144 mmol/L (136-145)   01/20/18  10:25    


 


Potassium  3.8 mmol/L (3.5-5.1)   01/20/18  10:25    


 


Chloride  108 mmol/L ()  H  01/20/18  10:25    


 


Carbon Dioxide  24.9 mmol/L (21-32)   01/20/18  10:25    


 


BUN  19 mg/dL (7-18)  H  01/20/18  10:25    


 


Creatinine  0.74 mg/dL (0.55-1.02)   01/20/18  10:25    


 


Est GFR (MDRD) Af Amer  > 60  (>60)   01/20/18  10:25    


 


Est GFR (MDRD) Non-Af  > 60  (>60)   01/20/18  10:25    


 


Glucose  155 mg/dL (65-99)  H  01/20/18  10:25    


 


Calcium  9.1 mg/dL (8.5-10.1)   01/20/18  10:25    














- XRAY


XRAY Interpreted by: Radiologist (Venous US: The deep venous ststem of the left 

lower extremity was evaluated from the level of the common femoral vein through 

the popliteal vein.  Normal color flow and augmentation can be observed.  In 

addition, normal compression is seen throughout the deep venous ststem.)





- Diagnosis


Discharge Problem: 


Contusion, thigh


Qualifiers:


 Encounter type: initial encounter Laterality: left Qualified Code(s): S70.12XA 

- Contusion of left thigh, initial encounter








- Discharge Plan


Condition: Stable





- Follow ups/Referrals


Follow ups/Referrals: 


NFD,None [Primary Care Provider] - 3 days





- Instructions

## 2018-01-22 ENCOUNTER — HOSPITAL ENCOUNTER (OUTPATIENT)
Dept: HOSPITAL 24 - ER | Age: 53
Setting detail: OBSERVATION
LOS: 2 days | Discharge: HOME | End: 2018-01-24
Attending: FAMILY MEDICINE | Admitting: FAMILY MEDICINE
Payer: MEDICAID

## 2018-01-22 VITALS — BODY MASS INDEX: 38 KG/M2

## 2018-01-22 DIAGNOSIS — A08.39: ICD-10-CM

## 2018-01-22 DIAGNOSIS — R10.84: ICD-10-CM

## 2018-01-22 DIAGNOSIS — I10: ICD-10-CM

## 2018-01-22 DIAGNOSIS — E11.65: ICD-10-CM

## 2018-01-22 DIAGNOSIS — R11.2: ICD-10-CM

## 2018-01-22 DIAGNOSIS — K29.80: Primary | ICD-10-CM

## 2018-01-22 DIAGNOSIS — R19.7: ICD-10-CM

## 2018-01-22 DIAGNOSIS — K21.9: ICD-10-CM

## 2018-01-22 DIAGNOSIS — E86.0: ICD-10-CM

## 2018-01-22 LAB
ALBUMIN SERPL BCP-MCNC: 3.4 G/DL (ref 3.4–5)
ALP SERPL-CCNC: 96 UNITS/L (ref 46–116)
ALT SERPL W P-5'-P-CCNC: 14 UNITS/L (ref 12–78)
AST SERPL W P-5'-P-CCNC: 12 UNITS/L (ref 15–37)
BACTERIA URNS QL MICRO: NEGATIVE /HPF
BASOPHILS # BLD AUTO: 0.1 X10^3/UL (ref 0–0.1)
BASOPHILS NFR BLD AUTO: 0.6 % (ref 0.2–1)
BILIRUB UR QL STRIP.AUTO: (no result)
BUN SERPL-MCNC: 22 MG/DL (ref 7–18)
CALCIUM ALBUM COR SERPL-SCNC: (no result) MG/DL (ref 8.5–10.1)
CALCIUM ALBUM COR SERPL-SCNC: 141 MMOL/L (ref 136–145)
CALCIUM SERPL-MCNC: 9.3 MG/DL (ref 8.5–10.1)
CHLORIDE SERPL-SCNC: 102 MMOL/L (ref 98–107)
CLARITY UR: (no result)
CO2 SERPL-SCNC: 32.1 MMOL/L (ref 21–32)
COLOR UR AUTO: YELLOW
CREAT SERPL-MCNC: 0.91 MG/DL (ref 0.55–1.02)
EGFR  BLACK RACES: > 60 (ref 60–?)
EOSINOPHIL # BLD AUTO: 0.1 X10^3/UL (ref 0–0.2)
EOSINOPHIL NFR BLD AUTO: 0.5 % (ref 0.9–2.9)
ERYTHROCYTE [DISTWIDTH] IN BLOOD BY AUTOMATED COUNT: 14.8 % (ref 11.6–16.5)
GLUCOSE UR QL STRIP.AUTO: NEGATIVE
HCT VFR BLD AUTO: 41.2 % (ref 36–47)
HGB BLD-MCNC: 13.5 G/DL (ref 12–16)
KETONES UR QL STRIP.AUTO: (no result)
LEUKOCYTE ESTERASE UR QL STRIP.AUTO: (no result)
LIPASE SERPL-CCNC: 143 UNITS/L (ref 73–393)
LYMPHOCYTES # BLD AUTO: 2.9 X10^3/UL (ref 1.3–2.9)
LYMPHOCYTES NFR BLD AUTO: 22.1 % (ref 21–51)
MCH RBC QN AUTO: 26 PG (ref 27–34)
MCHC RBC AUTO-ENTMCNC: 32.8 G/DL (ref 33–35)
MCV RBC AUTO: 79.3 FL (ref 80–100)
MONOCYTES # BLD AUTO: 0.7 X10^3/UL (ref 0.3–0.8)
MONOCYTES NFR BLD AUTO: 5.4 % (ref 0–13)
NEUTROPHILS # BLD AUTO: 9.5 X10^3/UL (ref 2.2–4.8)
NEUTROPHILS NFR BLD AUTO: 71.4 % (ref 42–75)
NITRITE UR QL STRIP.AUTO: NEGATIVE
PH UR STRIP.AUTO: 5 [PH] (ref 5–8)
PLATELET # BLD: 241 X10^3/UL (ref 150–450)
PMV BLD AUTO: 9.8 FL (ref 7.4–11)
PROT SERPL-MCNC: 7.5 G/DL (ref 6.4–8.2)
PROT UR QL STRIP.AUTO: (no result)
RBC # BLD AUTO: 5.2 X10^6/UL (ref 3.5–5.4)
RBC # UR STRIP.AUTO: (no result) /UL
RBC #/AREA URNS HPF: (no result) /HPF
SODIUM SERPL-SCNC: 140 MMOL/L (ref 136–145)
SP GR UR STRIP.AUTO: 1.02 (ref 1–1.03)
SQUAMOUS URNS QL MICRO: (no result) /HPF
UROBILINOGEN UR QL STRIP.AUTO: (no result)
WBC NRBC COR # BLD AUTO: 13.3 X10^3/UL (ref 3.6–10)

## 2018-01-22 PROCEDURE — 96374 THER/PROPH/DIAG INJ IV PUSH: CPT

## 2018-01-22 PROCEDURE — 80053 COMPREHEN METABOLIC PANEL: CPT

## 2018-01-22 PROCEDURE — 83735 ASSAY OF MAGNESIUM: CPT

## 2018-01-22 PROCEDURE — 36415 COLL VENOUS BLD VENIPUNCTURE: CPT

## 2018-01-22 PROCEDURE — 96365 THER/PROPH/DIAG IV INF INIT: CPT

## 2018-01-22 PROCEDURE — G0378 HOSPITAL OBSERVATION PER HR: HCPCS

## 2018-01-22 PROCEDURE — C9113 INJ PANTOPRAZOLE SODIUM, VIA: HCPCS

## 2018-01-22 PROCEDURE — 83690 ASSAY OF LIPASE: CPT

## 2018-01-22 PROCEDURE — 85025 COMPLETE CBC W/AUTO DIFF WBC: CPT

## 2018-01-22 PROCEDURE — 87502 INFLUENZA DNA AMP PROBE: CPT

## 2018-01-22 PROCEDURE — 84132 ASSAY OF SERUM POTASSIUM: CPT

## 2018-01-22 PROCEDURE — 99284 EMERGENCY DEPT VISIT MOD MDM: CPT

## 2018-01-22 PROCEDURE — A4217 STERILE WATER/SALINE, 500 ML: HCPCS

## 2018-01-22 PROCEDURE — 96367 TX/PROPH/DG ADDL SEQ IV INF: CPT

## 2018-01-22 PROCEDURE — 74177 CT ABD & PELVIS W/CONTRAST: CPT

## 2018-01-22 PROCEDURE — 81001 URINALYSIS AUTO W/SCOPE: CPT

## 2018-01-22 PROCEDURE — 96375 TX/PRO/DX INJ NEW DRUG ADDON: CPT

## 2018-01-22 RX ADMIN — SODIUM CHLORIDE SCH ML: 9 INJECTION, SOLUTION INTRAMUSCULAR; INTRAVENOUS; SUBCUTANEOUS at 22:40

## 2018-01-22 RX ADMIN — METOCLOPRAMIDE SCH MG: 5 INJECTION, SOLUTION INTRAMUSCULAR; INTRAVENOUS at 22:40

## 2018-01-22 RX ADMIN — SODIUM CHLORIDE SCH MLS/HR: 9 INJECTION, SOLUTION INTRAVENOUS at 22:39

## 2018-01-22 RX ADMIN — PANTOPRAZOLE SODIUM SCH MG: 40 INJECTION, POWDER, FOR SOLUTION INTRAVENOUS at 22:39

## 2018-01-22 NOTE — DR.NAUSEAF
HPI





- Primary Care Physician


Primary Care Physician: RAJWINDRE JEFFERY





- Complaints


Chief Complaint:: PT C/O NOT BEING ABLE TO KEEP ANYTHING DOWN SINCE 2 DAYS AGO 

WHEN SHE CAME TO THE ER AND GOT A PAIN SHOT.. PT C/O BEING WEAK AND NOT FEELING 

GOOD ..





- Reviewed


Nurses Notes Reviewed: Yes





- Source


History Provided: Patient





- Mode of Arrival


Mode of Arrival: Ambulatory





- Timing


Onset of Chief Complaint: 01/20/18





PMH





- PMH


Past Medical History: Yes


Past Medical History: Asthma, Diabetes, Hypertension


Past Surgical History: Yes


Surgical History: Cholecystectomy, Hysterectomy, Tonsillectomy


Past Surgical History Comment: THYROIDECTOMY,





- Family History


History of Family Medical Conditions: Yes


Family Medical History: Diabetes Mellitus, Coronary Artery Disease, Hypertension





- Social History


Does patient currently use any type of tobacco product: No


Have you used tobacco products in the last 12 months: No


Type of Tobacco Use: None


Does any household member use tobacco: No


Alcohol Use: None


Do you use any recreational Drugs:: No


Lives With: Family


Lives Where: Home





- infectious screening


In the last 2 months have you had wt loss of >10#?: NO


Have you had fever, night sweats or hemotysis?: No


Have you traveled outside the country in the last 6 months?: No


Isolation: Standard





ROS





- Review of Systems


Constitutional: Malaise ('just doesn't feel well'), Loss of Appetite


Eyes: No Symptoms Reported


ENTM: No Symptoms Reported, Nose Congestion


Respiratoy: Non-Productive Cough


Cardiovascular: negative: Chest Pain, Edema, Palpitations


Gastrointestinal/Abdominal: Diarrhea, Nausea, Vomiting (vomiting everything she 

eats and copious watery diarrhea)


Genitourinary: No Symptoms Reported


Neurological: Headache


Musculoskeletal: Joint Pain (diffuse), Muscle Pain


Integumentary: No Symptoms Reported


Hematologic/Lymphatic: No Symptoms Reported


Endocrine: No Symptoms Reported


Psychiatric: No Symptoms Reported


All Other Systems: Reviewed and Negative





PE





- Vital Signs


Vitals: 


 





Temperature                      97.7 F


Pulse Rate                       80


Respiratory Rate                 20


Blood Pressure [Right Arm]       128/65


Blood Pressure [Left Arm]        181/83


Blood Pressure                   146/66


O2 Sat by Pulse Oximetry         97











- General


Limitations: No Limitations


General Appearance: Alert, In No Apparent Distress





- Head


Head Exam: Normal Inspection, Normocephalic





- Eyes


Eye exam: Normal Appearance





- ENT


ENT Exam: Normal Exam, Normal Oropharynx





- Neck


Neck Exam: Normal Inspection, Trachea Midline.  negative: Tenderness, 

Meningismus, Lymphadenopathy





- Respiratory


Respiratory Exam: Normal Lung Sounds Bilat


Respiratory Exam: Bilateral Clear to Auscultation





- Cardiovascular


Cardiovascular Exam: Regular Rate, Normal Rhythm, Normal Heart Sounds.  negative

: Systolic Murmur, Diastolic Murmur





- Abdominal Exam


Abdominal Exam: Normal Inspection, Normal Bowel Sounds, Soft, Tenderness (mild 

diffuse TTP, nothing focal).  negative: Distention, Guarding, Rebound





- Extremities


Extremities Exam: Normal Inspection, Full ROM





- Neurologic


Neurological Exam: Alert, Oriented X3





- Psychiatric


Psychiatric Exam: Normal Affect, Normal Mood





- Skin


Skin Exam: Warm, Dry, Intact





ROR





- Labs Reviewed


Laboratory Results Reviewed?: Yes (lipase normal)


Result Diagrams: 


 01/22/18 16:28 01/22/18 16:28


Laboratory: 


 











WBC  13.3 X10^3/uL (3.6-10.0)  H  01/22/18  16:28    


 


RBC  5.20 X10^6/uL (3.5-5.4)   01/22/18  16:28    


 


Hgb  13.5 g/dL (12.0-16.0)   01/22/18  16:28    


 


Hct  41.2 % (36.0-47.0)   01/22/18  16:28    


 


MCV  79.3 fL (80.0-100.0)  L  01/22/18  16:28    


 


MCH  26.0 pg (27.0-34.0)  L  01/22/18  16:28    


 


MCHC  32.8 g/dL (33.0-35.0)  L  01/22/18 16:28    


 


RDW  14.8 % (11.6-16.5)   01/22/18 16:28    


 


Plt Count  241 X10^3/uL (150.0-450.0)   01/22/18  16:28    


 


MPV  9.8 fL (7.4-11.0)   01/22/18 16:28    


 


Neut %  71.4 % (42.0-75.0)   01/22/18 16:28    


 


Lymph %  22.1 % (21.0-51.0)   01/22/18  16:28    


 


Mono %  5.4 % (0.0-13.0)   01/22/18 16:28    


 


Eos %  0.5 % (0.9-2.9)  L  01/22/18  16:28    


 


Baso %  0.6 % (0.2-1.0)   01/22/18  16:28    


 


Neut #  9.5 x10^3/uL (2.2-4.8)  H  01/22/18  16:28    


 


Lymph #  2.9 X10^3/uL (1.3-2.9)   01/22/18  16:28    


 


Mono #  0.7 x10^3/uL (0.3-0.8)   01/22/18  16:28    


 


Eos #  0.1 x10^3/uL (0.0-0.2)   01/22/18  16:28    


 


Baso #  0.1 X10^3/uL (0.0-0.1)   01/22/18  16:28    


 


Absolute Nucleated RBC  0.0 /100WBC  01/22/18  16:28    


 


Sodium  140 mmol/L (136-145)   01/22/18  16:28    


 


Corrected Sodium  141 mmol/L (136-145)   01/22/18  16:28    


 


Potassium  3.3 mmol/L (3.5-5.1)  L  01/22/18  16:28    


 


Chloride  102 mmol/L ()   01/22/18  16:28    


 


Carbon Dioxide  32.1 mmol/L (21-32)  H  01/22/18  16:28    


 


BUN  22 mg/dL (7-18)  H  01/22/18  16:28    


 


Creatinine  0.91 mg/dL (0.55-1.02)   01/22/18  16:28    


 


Est GFR (MDRD) Af Amer  > 60  (>60)   01/22/18  16:28    


 


Est GFR (MDRD) Non-Af  > 60  (>60)   01/22/18  16:28    


 


Glucose  141 mg/dL (65-99)  H  01/22/18  16:28    


 


Calcium  9.3 mg/dL (8.5-10.1)   01/22/18  16:28    


 


Corrected Calcium  TNP   01/22/18  16:28    


 


Total Bilirubin  0.50 mg/dL (0.2-1.0)   01/22/18  16:28    


 


AST  12 Units/L (15-37)  L  01/22/18  16:28    


 


ALT  14 Units/L (12-78)   01/22/18  16:28    


 


Alkaline Phosphatase  96 Units/L ()   01/22/18  16:28    


 


Total Protein  7.5 g/dL (6.4-8.2)   01/22/18  16:28    


 


Albumin  3.4 g/dL (3.4-5.0)   01/22/18  16:28    


 


Globulin  4.1 g/dL (2.5-4.5)   01/22/18  16:28    


 


Albumin/Globulin Ratio  0.8 Ratio (1.1-2.1)  L  01/22/18  16:28    


 


Lipase  143 Units/L ()   01/22/18  16:28    


 


Specimen Type  Clean catch urine   01/22/18  17:14    


 


Urine Color  Yellow  (YELLOW)   01/22/18  17:14    


 


Urine Appearance  Hazy  (CLEAR)   01/22/18  17:14    


 


Urine pH  5.0  (5.0 - 8.0)   01/22/18  17:14    


 


Ur Specific Gravity  1.025  (1.000-1.030)   01/22/18  17:14    


 


Urine Protein  3+  (NEGATIVE)   01/22/18  17:14    


 


Urine Glucose (UA)  Negative  (NEGATIVE)   01/22/18  17:14    


 


Urine Ketones  1+  (NEGATIVE)   01/22/18  17:14    


 


Urine Occult Blood  1+  (NEGATIVE)   01/22/18  17:14    


 


Urine Nitrite  Negative  (NEGATIVE)   01/22/18  17:14    


 


Urine Bilirubin  1+  (NEGATIVE)   01/22/18  17:14    


 


Urine Urobilinogen  1+  (NORMAL)   01/22/18  17:14    


 


Ur Leukocyte Esterase  1+  (NEGATIVE)   01/22/18  17:14    


 


Urine RBC  0-2 /HPF (NEGATIVE)   01/22/18  17:14    


 


Urine WBC  0-2 /HPF (NEGATIVE)   01/22/18  17:14    


 


Ur Squamous Epith Cells  Rare /HPF (NEGATIVE)   01/22/18  17:14    


 


Urine Bacteria  Negative /HPF (NEGATIVE)   01/22/18  17:14    


 


Ur Culture Indicated?  No/not indicated   01/22/18  17:14    


 


Influenza Type A (PCR)  Negative  (NEGATIVE)   01/22/18  17:35    


 


Influenza Type B (PCR)  Negative  (NEGATIVE)   01/22/18  17:35    














- XRAY


XRAY Findings: CT shows duodenitis vs groove pancreatitis, nothing else acute(

lipase OK)





- Discharge Plan


Condition: Stable





- Follow ups/Referrals


Follow ups/Referrals: 


[facility],None [Primary Care Provider] - 3 days





- Instructions

## 2018-01-22 NOTE — CT
CT ABDOMEN AND PELVIS WITH IV CONTRAST    



CLINICAL HISTORY: 52-year-old female with inability to keep anything down for 2 days. Patient complai
ns of weakness and not feeling well.



COMPARISON: CT abdomen and pelvis 12/22/2017.

   

TECHNIQUE: Multiple contiguous axial images of the abdomen and pelvis were obtained following the adm
inistration of IV contrast.  Coronal and sagittal reformatted imaging was submitted.



FINDINGS:



The lung bases are clear without pulmonary nodules, masses, or pleural fluid collections.  The inferi
or imaged mediastinum and heart is normal in size and there is no pericardial effusion. 



Status post cholecystectomy. Liver and spleen are unremarkable. There is circumferential wall thicken
ing, edema and mild inflammatory change at the junction of the 3rd/4th portion of the duodenum and wi
thin the fat plane between the pancreatic head/uncinate process and duodenum at this level. No peripa
ncreatic fluid collection or inflammatory change within the lesser sac.



The adrenal glands are normal bilaterally.  The kidneys perfuse in a normal fashion and the ureters r
un in an unobstructed course to a minimally distended urinary bladder.  



Status post hysterectomy.  Vaginal cuff and adnexa are unremarkable. Pelvic phleboliths are present.



The appendix is normal in appearance. The bowel is without obstruction or inflammation and there is n
o free fluid or free air within the peritoneal cavity.  There are no pathologically enlarged lymph no
liam in the abdomen or pelvis. 



The arteriovascular structures are within normal limits.    



Soft tissues are normal.



The osseous structures are intact without fracture or malalignment.  







IMPRESSION:



1. Inflammation with edema and thickening of the bowel wall at the junction of the 3rd/4th portion of
 the duodenum with loss of the fat plane between the pancreatic head/uncinate process at this level. 
Differential considerations include duodenitis and groove pancreatitis. Correlate with serology and c
linically.

2. No other acute intra-abdominal or intrapelvic process.





Reported By:Electronically Signed by JERED CARTER MD at 1/22/2018 7:11:29 PM

## 2018-01-23 LAB
ALBUMIN SERPL BCP-MCNC: 2.8 G/DL (ref 3.4–5)
ALP SERPL-CCNC: 83 UNITS/L (ref 46–116)
ALT SERPL W P-5'-P-CCNC: 12 UNITS/L (ref 12–78)
AST SERPL W P-5'-P-CCNC: 12 UNITS/L (ref 15–37)
BASOPHILS # BLD AUTO: 0.1 X10^3/UL (ref 0–0.1)
BASOPHILS NFR BLD AUTO: 0.5 % (ref 0.2–1)
BUN SERPL-MCNC: 16 MG/DL (ref 7–18)
CALCIUM ALBUM COR SERPL-SCNC: (no result) MMOL/L (ref 136–145)
CALCIUM ALBUM COR SERPL-SCNC: 9.5 MG/DL (ref 8.5–10.1)
CALCIUM SERPL-MCNC: 8.5 MG/DL (ref 8.5–10.1)
CHLORIDE SERPL-SCNC: 105 MMOL/L (ref 98–107)
CO2 SERPL-SCNC: 30.1 MMOL/L (ref 21–32)
CREAT SERPL-MCNC: 0.87 MG/DL (ref 0.55–1.02)
EGFR  BLACK RACES: > 60 (ref 60–?)
EOSINOPHIL # BLD AUTO: 0.1 X10^3/UL (ref 0–0.2)
EOSINOPHIL NFR BLD AUTO: 1.1 % (ref 0.9–2.9)
ERYTHROCYTE [DISTWIDTH] IN BLOOD BY AUTOMATED COUNT: 14.9 % (ref 11.6–16.5)
HCT VFR BLD AUTO: 37.3 % (ref 36–47)
HGB BLD-MCNC: 12.1 G/DL (ref 12–16)
LYMPHOCYTES # BLD AUTO: 3.7 X10^3/UL (ref 1.3–2.9)
LYMPHOCYTES NFR BLD AUTO: 32.6 % (ref 21–51)
MCH RBC QN AUTO: 25.7 PG (ref 27–34)
MCHC RBC AUTO-ENTMCNC: 32.5 G/DL (ref 33–35)
MCV RBC AUTO: 79 FL (ref 80–100)
MONOCYTES # BLD AUTO: 0.6 X10^3/UL (ref 0.3–0.8)
MONOCYTES NFR BLD AUTO: 5 % (ref 0–13)
NEUTROPHILS # BLD AUTO: 6.9 X10^3/UL (ref 2.2–4.8)
NEUTROPHILS NFR BLD AUTO: 60.8 % (ref 42–75)
PLAT MORPH BLD: NORMAL
PLATELET # BLD: 211 X10^3/UL (ref 150–450)
PMV BLD AUTO: 10.2 FL (ref 7.4–11)
PROT SERPL-MCNC: 6.3 G/DL (ref 6.4–8.2)
RBC # BLD AUTO: 4.72 X10^6/UL (ref 3.5–5.4)
RBC MORPH BLD: (no result)
RBC MORPH BLD: SLIGHT
SODIUM SERPL-SCNC: 142 MMOL/L (ref 136–145)
WBC NRBC COR # BLD AUTO: 11.4 X10^3/UL (ref 3.6–10)

## 2018-01-23 PROCEDURE — 0DB98ZX EXCISION OF DUODENUM, VIA NATURAL OR ARTIFICIAL OPENING ENDOSCOPIC, DIAGNOSTIC: ICD-10-PCS | Performed by: SURGERY

## 2018-01-23 PROCEDURE — 0DB68ZX EXCISION OF STOMACH, VIA NATURAL OR ARTIFICIAL OPENING ENDOSCOPIC, DIAGNOSTIC: ICD-10-PCS | Performed by: SURGERY

## 2018-01-23 RX ADMIN — METOCLOPRAMIDE SCH MG: 5 INJECTION, SOLUTION INTRAMUSCULAR; INTRAVENOUS at 13:27

## 2018-01-23 RX ADMIN — SODIUM CHLORIDE SCH: 9 INJECTION, SOLUTION INTRAVENOUS at 13:58

## 2018-01-23 RX ADMIN — POTASSIUM CHLORIDE, DEXTROSE MONOHYDRATE AND SODIUM CHLORIDE PRN MEQ: 150; 5; 200 INJECTION, SOLUTION INTRAVENOUS at 06:40

## 2018-01-23 RX ADMIN — SUCRALFATE SCH GM: 1 TABLET ORAL at 14:01

## 2018-01-23 RX ADMIN — PANTOPRAZOLE SODIUM SCH MG: 40 INJECTION, POWDER, FOR SOLUTION INTRAVENOUS at 20:32

## 2018-01-23 RX ADMIN — SODIUM CHLORIDE SCH ML: 9 INJECTION, SOLUTION INTRAMUSCULAR; INTRAVENOUS; SUBCUTANEOUS at 05:38

## 2018-01-23 RX ADMIN — SODIUM CHLORIDE SCH ML: 9 INJECTION, SOLUTION INTRAMUSCULAR; INTRAVENOUS; SUBCUTANEOUS at 13:59

## 2018-01-23 RX ADMIN — SUCRALFATE SCH GM: 1 TABLET ORAL at 20:32

## 2018-01-23 RX ADMIN — PANTOPRAZOLE SODIUM SCH MG: 40 INJECTION, POWDER, FOR SOLUTION INTRAVENOUS at 09:06

## 2018-01-23 RX ADMIN — METOCLOPRAMIDE SCH MG: 5 INJECTION, SOLUTION INTRAMUSCULAR; INTRAVENOUS at 20:33

## 2018-01-23 RX ADMIN — SODIUM CHLORIDE SCH MLS/HR: 9 INJECTION, SOLUTION INTRAVENOUS at 04:34

## 2018-01-23 RX ADMIN — SODIUM CHLORIDE SCH MLS/HR: 9 INJECTION, SOLUTION INTRAVENOUS at 21:36

## 2018-01-23 RX ADMIN — SODIUM CHLORIDE SCH ML: 9 INJECTION, SOLUTION INTRAMUSCULAR; INTRAVENOUS; SUBCUTANEOUS at 21:36

## 2018-01-23 RX ADMIN — SUCRALFATE SCH: 1 TABLET ORAL at 15:48

## 2018-01-23 RX ADMIN — METOCLOPRAMIDE SCH MG: 5 INJECTION, SOLUTION INTRAMUSCULAR; INTRAVENOUS at 04:33

## 2018-01-24 VITALS — SYSTOLIC BLOOD PRESSURE: 195 MMHG | DIASTOLIC BLOOD PRESSURE: 89 MMHG

## 2018-01-24 LAB
ALBUMIN SERPL BCP-MCNC: 2.7 G/DL (ref 3.4–5)
ALP SERPL-CCNC: 85 UNITS/L (ref 46–116)
ALT SERPL W P-5'-P-CCNC: 15 UNITS/L (ref 12–78)
AST SERPL W P-5'-P-CCNC: 10 UNITS/L (ref 15–37)
BASOPHILS # BLD AUTO: 0.1 X10^3/UL (ref 0–0.1)
BASOPHILS NFR BLD AUTO: 0.6 % (ref 0.2–1)
BUN SERPL-MCNC: 9 MG/DL (ref 7–18)
CALCIUM ALBUM COR SERPL-SCNC: (no result) MMOL/L (ref 136–145)
CALCIUM ALBUM COR SERPL-SCNC: 9.6 MG/DL (ref 8.5–10.1)
CALCIUM SERPL-MCNC: 8.6 MG/DL (ref 8.5–10.1)
CHLORIDE SERPL-SCNC: 109 MMOL/L (ref 98–107)
CO2 SERPL-SCNC: 30.8 MMOL/L (ref 21–32)
CREAT SERPL-MCNC: 0.67 MG/DL (ref 0.55–1.02)
EGFR  BLACK RACES: > 60 (ref 60–?)
EOSINOPHIL # BLD AUTO: 0.2 X10^3/UL (ref 0–0.2)
EOSINOPHIL NFR BLD AUTO: 2.1 % (ref 0.9–2.9)
ERYTHROCYTE [DISTWIDTH] IN BLOOD BY AUTOMATED COUNT: 14.9 % (ref 11.6–16.5)
HCT VFR BLD AUTO: 37.4 % (ref 36–47)
HGB BLD-MCNC: 12 G/DL (ref 12–16)
LYMPHOCYTES # BLD AUTO: 2.9 X10^3/UL (ref 1.3–2.9)
LYMPHOCYTES NFR BLD AUTO: 34.6 % (ref 21–51)
MCH RBC QN AUTO: 25.5 PG (ref 27–34)
MCHC RBC AUTO-ENTMCNC: 32 G/DL (ref 33–35)
MCV RBC AUTO: 79.7 FL (ref 80–100)
MONOCYTES # BLD AUTO: 0.4 X10^3/UL (ref 0.3–0.8)
MONOCYTES NFR BLD AUTO: 5.4 % (ref 0–13)
NEUTROPHILS # BLD AUTO: 4.7 X10^3/UL (ref 2.2–4.8)
NEUTROPHILS NFR BLD AUTO: 57.3 % (ref 42–75)
PLAT MORPH BLD: NORMAL
PLATELET # BLD: 191 X10^3/UL (ref 150–450)
PMV BLD AUTO: 10.4 FL (ref 7.4–11)
PROT SERPL-MCNC: 6.1 G/DL (ref 6.4–8.2)
RBC # BLD AUTO: 4.69 X10^6/UL (ref 3.5–5.4)
RBC MORPH BLD: NORMAL
SODIUM SERPL-SCNC: 144 MMOL/L (ref 136–145)
WBC NRBC COR # BLD AUTO: 8.3 X10^3/UL (ref 3.6–10)

## 2018-01-24 RX ADMIN — PANTOPRAZOLE SODIUM SCH MG: 40 INJECTION, POWDER, FOR SOLUTION INTRAVENOUS at 09:06

## 2018-01-24 RX ADMIN — SODIUM CHLORIDE SCH: 9 INJECTION, SOLUTION INTRAVENOUS at 05:15

## 2018-01-24 RX ADMIN — POTASSIUM CHLORIDE, DEXTROSE MONOHYDRATE AND SODIUM CHLORIDE PRN MEQ: 150; 5; 200 INJECTION, SOLUTION INTRAVENOUS at 06:20

## 2018-01-24 RX ADMIN — SODIUM CHLORIDE SCH ML: 9 INJECTION, SOLUTION INTRAMUSCULAR; INTRAVENOUS; SUBCUTANEOUS at 05:16

## 2018-01-24 RX ADMIN — SUCRALFATE SCH GM: 1 TABLET ORAL at 05:41

## 2018-01-24 RX ADMIN — METOCLOPRAMIDE SCH MG: 5 INJECTION, SOLUTION INTRAMUSCULAR; INTRAVENOUS at 05:15

## 2018-01-24 RX ADMIN — SUCRALFATE SCH GM: 1 TABLET ORAL at 10:54

## 2018-04-04 ENCOUNTER — HOSPITAL ENCOUNTER (EMERGENCY)
Dept: HOSPITAL 24 - ER | Age: 53
Discharge: HOME | End: 2018-04-04
Payer: MEDICAID

## 2018-04-04 VITALS — BODY MASS INDEX: 40.7 KG/M2

## 2018-04-04 VITALS — SYSTOLIC BLOOD PRESSURE: 149 MMHG | DIASTOLIC BLOOD PRESSURE: 69 MMHG

## 2018-04-04 DIAGNOSIS — K52.89: Primary | ICD-10-CM

## 2018-04-04 DIAGNOSIS — E87.6: ICD-10-CM

## 2018-04-04 DIAGNOSIS — E86.0: ICD-10-CM

## 2018-04-04 LAB
ALBUMIN SERPL BCP-MCNC: 3.3 G/DL (ref 3.4–5)
ALP SERPL-CCNC: 103 UNITS/L (ref 46–116)
ALT SERPL W P-5'-P-CCNC: 19 UNITS/L (ref 12–78)
AST SERPL W P-5'-P-CCNC: 11 UNITS/L (ref 15–37)
BACTERIA URNS QL MICRO: NEGATIVE /HPF
BASOPHILS # BLD AUTO: 0 X10^3/UL (ref 0–0.1)
BASOPHILS NFR BLD AUTO: 0.4 % (ref 0.2–1)
BILIRUB UR QL STRIP.AUTO: NEGATIVE
BUN SERPL-MCNC: 19 MG/DL (ref 7–18)
CALCIUM ALBUM COR SERPL-SCNC: 146 MMOL/L (ref 136–145)
CALCIUM ALBUM COR SERPL-SCNC: 9.1 MG/DL (ref 8.5–10.1)
CALCIUM SERPL-MCNC: 8.5 MG/DL (ref 8.5–10.1)
CHLORIDE SERPL-SCNC: 111 MMOL/L (ref 98–107)
CLARITY UR: (no result)
CO2 SERPL-SCNC: 19.7 MMOL/L (ref 21–32)
COLOR UR AUTO: YELLOW
CREAT SERPL-MCNC: 0.94 MG/DL (ref 0.55–1.02)
CRP SERPL-MCNC: 2.4 MG/L (ref 0–3)
EGFR  BLACK RACES: > 60 (ref 60–?)
EOSINOPHIL # BLD AUTO: 0.1 X10^3/UL (ref 0–0.2)
EOSINOPHIL NFR BLD AUTO: 1 % (ref 0.9–2.9)
ERYTHROCYTE [DISTWIDTH] IN BLOOD BY AUTOMATED COUNT: 14.6 % (ref 11.6–16.5)
GLUCOSE UR QL STRIP.AUTO: NEGATIVE
HCT VFR BLD AUTO: 40.6 % (ref 36–47)
HGB BLD-MCNC: 13.3 G/DL (ref 12–16)
HYALINE CASTS URNS QL MICRO: (no result) /LPF
KETONES UR QL STRIP.AUTO: NEGATIVE
LEUKOCYTE ESTERASE UR QL STRIP.AUTO: NEGATIVE
LYMPHOCYTES # BLD AUTO: 2.5 X10^3/UL (ref 1.3–2.9)
LYMPHOCYTES NFR BLD AUTO: 24.7 % (ref 21–51)
MCH RBC QN AUTO: 26.1 PG (ref 27–34)
MCHC RBC AUTO-ENTMCNC: 32.7 G/DL (ref 33–35)
MCV RBC AUTO: 79.7 FL (ref 80–100)
MONOCYTES # BLD AUTO: 0.5 X10^3/UL (ref 0.3–0.8)
MONOCYTES NFR BLD AUTO: 4.8 % (ref 0–13)
MUCOUS THREADS #/AREA URNS HPF: (no result) /HPF
NEUTROPHILS # BLD AUTO: 7 X10^3/UL (ref 2.2–4.8)
NEUTROPHILS NFR BLD AUTO: 69.1 % (ref 42–75)
NITRITE UR QL STRIP.AUTO: NEGATIVE
PH UR STRIP.AUTO: 5 [PH] (ref 5–8)
PLATELET # BLD: 183 X10^3/UL (ref 150–450)
PMV BLD AUTO: 10.2 FL (ref 7.4–11)
PROT SERPL-MCNC: 7.3 G/DL (ref 6.4–8.2)
PROT UR QL STRIP.AUTO: (no result)
RBC # BLD AUTO: 5.09 X10^6/UL (ref 3.5–5.4)
RBC # UR STRIP.AUTO: NEGATIVE /UL
RBC #/AREA URNS HPF: (no result) /HPF
SODIUM SERPL-SCNC: 144 MMOL/L (ref 136–145)
SP GR UR STRIP.AUTO: 1.01 (ref 1–1.03)
SQUAMOUS URNS QL MICRO: (no result) /HPF
UROBILINOGEN UR QL STRIP.AUTO: NORMAL
WBC NRBC COR # BLD AUTO: 10.2 X10^3/UL (ref 3.6–10)

## 2018-04-04 PROCEDURE — 99283 EMERGENCY DEPT VISIT LOW MDM: CPT

## 2018-04-04 PROCEDURE — 85025 COMPLETE CBC W/AUTO DIFF WBC: CPT

## 2018-04-04 PROCEDURE — 36415 COLL VENOUS BLD VENIPUNCTURE: CPT

## 2018-04-04 PROCEDURE — 96375 TX/PRO/DX INJ NEW DRUG ADDON: CPT

## 2018-04-04 PROCEDURE — 81001 URINALYSIS AUTO W/SCOPE: CPT

## 2018-04-04 PROCEDURE — 96374 THER/PROPH/DIAG INJ IV PUSH: CPT

## 2018-04-04 PROCEDURE — 96365 THER/PROPH/DIAG IV INF INIT: CPT

## 2018-04-04 PROCEDURE — 96372 THER/PROPH/DIAG INJ SC/IM: CPT

## 2018-04-04 PROCEDURE — 80053 COMPREHEN METABOLIC PANEL: CPT

## 2018-04-04 PROCEDURE — 86140 C-REACTIVE PROTEIN: CPT

## 2018-04-04 NOTE — DR.GENAD
HPI





- PCP


Primary Care Physician: JANAE





- Complaint/Symptoms


Chief Complaint:: ABDOMEN PAIN WITH NAUSEA AND DIARRHEA. GREENISH VOMIT AND 

DIARRHEA.FOR TWO DAYS. TOOK OTC MEDICINE. TOOK ZANTAC AND PEPCID





- Source


History Provided: Patient





- Mode of Arrival


Mode of Arrival: Ambulatory





- Timing


Onset of Chief Complaint: 04/02/18





PMH





- PMH


Past Medical History: Yes


Past Medical History: Asthma, Diabetes, Hypertension


Past Surgical History: Yes


Surgical History: Cholecystectomy, Hysterectomy, Thyroidectomy, Tonsillectomy


Past Surgical History Comment: POLYPS REMOVED





- Family History


History of Family Medical Conditions: Yes


Family Medical History: Diabetes Mellitus, Coronary Artery Disease, Hypertension





- Social History


Does any household member use tobacco: Yes


Alcohol Use: None


Do you use any recreational Drugs:: No


Lives With: Family


Lives Where: Home





- infectious screening


In the last 2 months have you had wt loss of >10#?: NO


Have you had fever, night sweats or hemotysis?: No


Have you traveled outside the country in the last 6 months?: No


Isolation: Standard





ROS





- Review of Systems


Eyes: No Symptoms Reported


ENTM: No Symptoms Reported


Respiratoy: No Symptoms Reported


Cardiovascular: No Symptoms Reported


Gastrointestinal/Abdominal: Diarrhea, Nausea, Vomiting


Genitourinary: No Symptoms Reported


Neurological: No Symptoms Reported


Musculoskeletal: No Symptoms Reported


Integumentary: No Symptoms Reported


Hematologic/Lymphatic: No Symptoms Reported


Endocrine: No Symptoms Reported


Psychiatric: No Symptoms Reported


All Other Systems: Reviewed and Negative





PE





- Vital Signs


Vitals: 


 





Temperature                      98.7 F


Pulse Rate                       68


Respiratory Rate                 22


Blood Pressure [Right Arm]       195/89


Blood Pressure [Left Arm]        186/76


Blood Pressure                   149/69


O2 Sat by Pulse Oximetry         99











- General


Limitations: No Limitations





- Head


Head Exam: Normal Inspection, Atraumatic





- Eyes


Eye exam: Normal Appearance, PERRL, EOMI





- ENT


ENT Exam: Normal Exam


External Ear Exam: Normal External Inspection


TM/Canal Exam: Bilateral Normal


Nose Exam: Normal Nose Exam


Mouth Exam: Normal Inspection


Throat Exam: Normal Inspection





- Neck


Neck Exam: Normal Inspection





- Chest


Chest Inspection: Normal Inspection, Symmetric Chest Wall Rise





- Respiratory


Respiratory Exam: Normal Lung Sounds Bilat


Respiratory Exam: Bilateral Clear to Auscultation





- Cardiovascular


Cardiovascular Exam: Regular Rate, Normal Rhythm





- Abdominal Exam


Abdominal Exam: Normal Inspection, Soft, Hyperactive Bowel Sounds, Hypoactive 

Bowel Sounds


Abdominal Tenderness: negative: RUQ, RLQ, LUQ, LLQ, Epigastrium, Suprapubic, 

Diffuse, Mild, Moderate, Severe, Other





- Extremities


Extremities Exam: Normal Inspection, Full ROM





- Back


Back Exam: Normal Inspection, Full ROM





- Neurologic


Neurological Exam: Alert, Oriented X3, CN II-XII Intact





- Psychiatric


Psychiatric Exam: Normal Affect





- Skin


Skin Exam: Warm, Dry, Intact





ROR





- Labs Reviewed


Result Diagrams: 


 04/04/18 09:00





 04/04/18 09:00


Laboratory: 


 











WBC  10.2 X10^3/uL (3.6-10.0)  H  04/04/18  09:00    


 


RBC  5.09 X10^6/uL (3.5-5.4)   04/04/18  09:00    


 


Hgb  13.3 g/dL (12.0-16.0)   04/04/18  09:00    


 


Hct  40.6 % (36.0-47.0)   04/04/18  09:00    


 


MCV  79.7 fL (80.0-100.0)  L  04/04/18  09:00    


 


MCH  26.1 pg (27.0-34.0)  L  04/04/18  09:00    


 


MCHC  32.7 g/dL (33.0-35.0)  L  04/04/18  09:00    


 


RDW  14.6 % (11.6-16.5)   04/04/18  09:00    


 


Plt Count  183 X10^3/uL (150.0-450.0)   04/04/18  09:00    


 


MPV  10.2 fL (7.4-11.0)   04/04/18  09:00    


 


Neut % (Auto)  69.1 % (42.0-75.0)   04/04/18  09:00    


 


Lymph % (Auto)  24.7 % (21.0-51.0)   04/04/18  09:00    


 


Mono % (Auto)  4.8 % (0.0-13.0)   04/04/18  09:00    


 


Eos % (Auto)  1.0 % (0.9-2.9)   04/04/18  09:00    


 


Baso % (Auto)  0.4 % (0.2-1.0)   04/04/18  09:00    


 


Neut # (Auto)  7.0 x10^3/uL (2.2-4.8)  H  04/04/18  09:00    


 


Lymph # (Auto)  2.5 X10^3/uL (1.3-2.9)   04/04/18  09:00    


 


Mono # (Auto)  0.5 x10^3/uL (0.3-0.8)   04/04/18  09:00    


 


Eos # (Auto)  0.1 x10^3/uL (0.0-0.2)   04/04/18  09:00    


 


Baso # (Auto)  0.0 X10^3/uL (0.0-0.1)   04/04/18  09:00    


 


Absolute Nucleated RBC  0.0 /100WBC  04/04/18  09:00    


 


Sodium  144 mmol/L (136-145)   04/04/18  09:00    


 


Corrected Sodium  146 mmol/L (136-145)  H  04/04/18  09:00    


 


Potassium  3.2 mmol/L (3.5-5.1)  L  04/04/18  09:00    


 


Chloride  111 mmol/L ()  H  04/04/18  09:00    


 


Carbon Dioxide  19.7 mmol/L (21-32)  L  04/04/18  09:00    


 


BUN  19 mg/dL (7-18)  H  04/04/18  09:00    


 


Creatinine  0.94 mg/dL (0.55-1.02)   04/04/18  09:00    


 


Est GFR (MDRD) Af Amer  > 60  (>60)   04/04/18  09:00    


 


Est GFR (MDRD) Non-Af  > 60  (>60)   04/04/18  09:00    


 


Glucose  182 mg/dL (65-99)  H  04/04/18  09:00    


 


Calcium  8.5 mg/dL (8.5-10.1)   04/04/18  09:00    


 


Corrected Calcium  9.1 mg/dL (8.5-10.1)   04/04/18  09:00    


 


Total Bilirubin  0.30 mg/dL (0.2-1.0)   04/04/18  09:00    


 


AST  11 Units/L (15-37)  L  04/04/18  09:00    


 


ALT  19 Units/L (12-78)   04/04/18  09:00    


 


Alkaline Phosphatase  103 Units/L ()   04/04/18  09:00    


 


C-Reactive Protein  2.40 mg/L (0-3.0)   04/04/18  09:00    


 


Total Protein  7.3 g/dL (6.4-8.2)   04/04/18  09:00    


 


Albumin  3.3 g/dL (3.4-5.0)  L  04/04/18  09:00    


 


Globulin  4.0 g/dL (2.5-4.5)   04/04/18  09:00    


 


Albumin/Globulin Ratio  0.8 Ratio (1.1-2.1)  L  04/04/18  09:00    














- Diagnosis


Discharge Problem: 


 Acute gastroenteritis, Hypokalemia, Mild dehydration








- Discharge Plan


Condition: Stable


Prescriptions: 


Dicyclomine HCl [Bentyl Cap 10 mg] 10 mg PO TID #18 cap


Promethazine HCl 50 mg PO Q6H #12 tablet





- Follow ups/Referrals


Follow ups/Referrals: 


CORY SKY [Primary Care Provider] - 3 days





- Instructions

## 2018-04-07 ENCOUNTER — HOSPITAL ENCOUNTER (INPATIENT)
Dept: HOSPITAL 24 - ER | Age: 53
LOS: 3 days | Discharge: HOME | DRG: 392 | End: 2018-04-10
Attending: INTERNAL MEDICINE | Admitting: INTERNAL MEDICINE
Payer: MEDICAID

## 2018-04-07 VITALS — BODY MASS INDEX: 40.7 KG/M2

## 2018-04-07 DIAGNOSIS — K21.9: ICD-10-CM

## 2018-04-07 DIAGNOSIS — K52.89: Primary | ICD-10-CM

## 2018-04-07 DIAGNOSIS — E11.65: ICD-10-CM

## 2018-04-07 DIAGNOSIS — E87.6: ICD-10-CM

## 2018-04-07 DIAGNOSIS — R19.7: ICD-10-CM

## 2018-04-07 DIAGNOSIS — E87.2: ICD-10-CM

## 2018-04-07 DIAGNOSIS — H65.02: ICD-10-CM

## 2018-04-07 DIAGNOSIS — Z86.010: ICD-10-CM

## 2018-04-07 DIAGNOSIS — E86.0: ICD-10-CM

## 2018-04-07 DIAGNOSIS — R11.2: ICD-10-CM

## 2018-04-07 DIAGNOSIS — R10.84: ICD-10-CM

## 2018-04-07 DIAGNOSIS — I10: ICD-10-CM

## 2018-04-07 LAB
ALBUMIN SERPL BCP-MCNC: 3.5 G/DL (ref 3.4–5)
ALP SERPL-CCNC: 106 UNITS/L (ref 46–116)
ALT SERPL W P-5'-P-CCNC: 17 UNITS/L (ref 12–78)
AMYLASE SERPL-CCNC: 58 UNITS/L (ref 25–115)
AST SERPL W P-5'-P-CCNC: 11 UNITS/L (ref 15–37)
BACTERIA URNS QL MICRO: (no result) /HPF
BASOPHILS # BLD AUTO: 0.2 X10^3/UL (ref 0–0.1)
BASOPHILS NFR BLD AUTO: 2.1 % (ref 0.2–1)
BILIRUB UR QL STRIP.AUTO: NEGATIVE
BUN SERPL-MCNC: 28 MG/DL (ref 7–18)
C PARVUM AG STL QL IA: NEGATIVE
CALCIUM ALBUM COR SERPL-SCNC: (no result) MG/DL (ref 8.5–10.1)
CALCIUM ALBUM COR SERPL-SCNC: 142 MMOL/L (ref 136–145)
CALCIUM SERPL-MCNC: 8.7 MG/DL (ref 8.5–10.1)
CHLORIDE SERPL-SCNC: 109 MMOL/L (ref 98–107)
CLARITY UR: (no result)
CO2 SERPL-SCNC: 19.9 MMOL/L (ref 21–32)
COARSE GRAN CASTS URNS QL MICRO: (no result) /HPF
COLOR UR AUTO: YELLOW
CREAT SERPL-MCNC: 1.02 MG/DL (ref 0.55–1.02)
EGFR  BLACK RACES: > 60 (ref 60–?)
EOSINOPHIL # BLD AUTO: 0.1 X10^3/UL (ref 0–0.2)
EOSINOPHIL NFR BLD AUTO: 0.5 % (ref 0.9–2.9)
ERYTHROCYTE [DISTWIDTH] IN BLOOD BY AUTOMATED COUNT: 14.3 % (ref 11.6–16.5)
G LAMBLIA AG STL QL IA: NEGATIVE
GLUCOSE UR QL STRIP.AUTO: NEGATIVE
GRAN CASTS URNS QL MICRO: (no result) /LPF
HCG SER QL IA.RAPID: (no result) MIU/ML
HCT VFR BLD AUTO: 43.4 % (ref 36–47)
HGB BLD-MCNC: 14.2 G/DL (ref 12–16)
KETONES UR QL STRIP.AUTO: NEGATIVE
LEUKOCYTE ESTERASE UR QL STRIP.AUTO: NEGATIVE
LIPASE SERPL-CCNC: 123 UNITS/L (ref 73–393)
LYMPHOCYTES # BLD AUTO: 2.2 X10^3/UL (ref 1.3–2.9)
LYMPHOCYTES NFR BLD AUTO: 20.8 % (ref 21–51)
MCH RBC QN AUTO: 26 PG (ref 27–34)
MCHC RBC AUTO-ENTMCNC: 32.8 G/DL (ref 33–35)
MCV RBC AUTO: 79.4 FL (ref 80–100)
MONOCYTES # BLD AUTO: 0.5 X10^3/UL (ref 0.3–0.8)
MONOCYTES NFR BLD AUTO: 4.4 % (ref 0–13)
NEUTROPHILS # BLD AUTO: 7.5 X10^3/UL (ref 2.2–4.8)
NEUTROPHILS NFR BLD AUTO: 72.2 % (ref 42–75)
NITRITE UR QL STRIP.AUTO: NEGATIVE
PH UR STRIP.AUTO: 6 [PH] (ref 5–8)
PLATELET # BLD: 203 X10^3/UL (ref 150–450)
PMV BLD AUTO: 10.2 FL (ref 7.4–11)
PROT SERPL-MCNC: 7.7 G/DL (ref 6.4–8.2)
PROT UR QL STRIP.AUTO: (no result)
RBC # BLD AUTO: 5.47 X10^6/UL (ref 3.5–5.4)
RBC # UR STRIP.AUTO: NEGATIVE /UL
RBC #/AREA URNS HPF: (no result) /HPF
SODIUM SERPL-SCNC: 141 MMOL/L (ref 136–145)
SP GR UR STRIP.AUTO: 1.02 (ref 1–1.03)
SQUAMOUS URNS QL MICRO: (no result) /HPF
STOOL FOR WBC: NEGATIVE
UROBILINOGEN UR QL STRIP.AUTO: NORMAL
WBC NRBC COR # BLD AUTO: 10.4 X10^3/UL (ref 3.6–10)

## 2018-04-07 PROCEDURE — 87336 ENTAMOEB HIST DISPR AG IA: CPT

## 2018-04-07 PROCEDURE — 84703 CHORIONIC GONADOTROPIN ASSAY: CPT

## 2018-04-07 PROCEDURE — 87427 SHIGA-LIKE TOXIN AG IA: CPT

## 2018-04-07 PROCEDURE — 84132 ASSAY OF SERUM POTASSIUM: CPT

## 2018-04-07 PROCEDURE — 86140 C-REACTIVE PROTEIN: CPT

## 2018-04-07 PROCEDURE — 82150 ASSAY OF AMYLASE: CPT

## 2018-04-07 PROCEDURE — 74176 CT ABD & PELVIS W/O CONTRAST: CPT

## 2018-04-07 PROCEDURE — 83735 ASSAY OF MAGNESIUM: CPT

## 2018-04-07 PROCEDURE — 87493 C DIFF AMPLIFIED PROBE: CPT

## 2018-04-07 PROCEDURE — 96372 THER/PROPH/DIAG INJ SC/IM: CPT

## 2018-04-07 PROCEDURE — 96375 TX/PRO/DX INJ NEW DRUG ADDON: CPT

## 2018-04-07 PROCEDURE — 85025 COMPLETE CBC W/AUTO DIFF WBC: CPT

## 2018-04-07 PROCEDURE — 87045 FECES CULTURE AEROBIC BACT: CPT

## 2018-04-07 PROCEDURE — 99221 1ST HOSP IP/OBS SF/LOW 40: CPT

## 2018-04-07 PROCEDURE — 74018 RADEX ABDOMEN 1 VIEW: CPT

## 2018-04-07 PROCEDURE — 87329 GIARDIA AG IA: CPT

## 2018-04-07 PROCEDURE — 82274 ASSAY TEST FOR BLOOD FECAL: CPT

## 2018-04-07 PROCEDURE — S0028 INJECTION, FAMOTIDINE, 20 MG: HCPCS

## 2018-04-07 PROCEDURE — 83690 ASSAY OF LIPASE: CPT

## 2018-04-07 PROCEDURE — 99231 SBSQ HOSP IP/OBS SF/LOW 25: CPT

## 2018-04-07 PROCEDURE — 81001 URINALYSIS AUTO W/SCOPE: CPT

## 2018-04-07 PROCEDURE — 94760 N-INVAS EAR/PLS OXIMETRY 1: CPT

## 2018-04-07 PROCEDURE — 74250 X-RAY XM SM INT 1CNTRST STD: CPT

## 2018-04-07 PROCEDURE — 99282 EMERGENCY DEPT VISIT SF MDM: CPT

## 2018-04-07 PROCEDURE — 80053 COMPREHEN METABOLIC PANEL: CPT

## 2018-04-07 PROCEDURE — 99284 EMERGENCY DEPT VISIT MOD MDM: CPT

## 2018-04-07 PROCEDURE — 83630 LACTOFERRIN FECAL (QUAL): CPT

## 2018-04-07 PROCEDURE — 36415 COLL VENOUS BLD VENIPUNCTURE: CPT

## 2018-04-07 PROCEDURE — 74022 RADEX COMPL AQT ABD SERIES: CPT

## 2018-04-07 PROCEDURE — 87449 NOS EACH ORGANISM AG IA: CPT

## 2018-04-07 PROCEDURE — 99283 EMERGENCY DEPT VISIT LOW MDM: CPT

## 2018-04-07 PROCEDURE — 96365 THER/PROPH/DIAG IV INF INIT: CPT

## 2018-04-07 PROCEDURE — 96367 TX/PROPH/DG ADDL SEQ IV INF: CPT

## 2018-04-07 PROCEDURE — 96374 THER/PROPH/DIAG INJ IV PUSH: CPT

## 2018-04-07 PROCEDURE — 87328 CRYPTOSPORIDIUM AG IA: CPT

## 2018-04-07 PROCEDURE — 0D9670Z DRAINAGE OF STOMACH WITH DRAINAGE DEVICE, VIA NATURAL OR ARTIFICIAL OPENING: ICD-10-PCS | Performed by: INTERNAL MEDICINE

## 2018-04-07 PROCEDURE — 94640 AIRWAY INHALATION TREATMENT: CPT

## 2018-04-07 RX ADMIN — CEFTRIAXONE SCH: 1 INJECTION, POWDER, FOR SOLUTION INTRAMUSCULAR; INTRAVENOUS at 09:41

## 2018-04-07 RX ADMIN — MORPHINE SULFATE PRN MG: 2 INJECTION, SOLUTION INTRAMUSCULAR; INTRAVENOUS at 23:04

## 2018-04-07 RX ADMIN — HYDROMORPHONE HYDROCHLORIDE PRN MG: 2 INJECTION, SOLUTION INTRAMUSCULAR; INTRAVENOUS; SUBCUTANEOUS at 18:11

## 2018-04-07 RX ADMIN — POTASSIUM CHLORIDE AND SODIUM CHLORIDE SCH MLS/HR: 450; 150 INJECTION, SOLUTION INTRAVENOUS at 16:55

## 2018-04-07 RX ADMIN — POTASSIUM CHLORIDE AND SODIUM CHLORIDE SCH: 450; 150 INJECTION, SOLUTION INTRAVENOUS at 16:34

## 2018-04-07 RX ADMIN — MORPHINE SULFATE PRN MG: 2 INJECTION, SOLUTION INTRAMUSCULAR; INTRAVENOUS at 14:36

## 2018-04-07 RX ADMIN — SODIUM CHLORIDE SCH: 9 INJECTION, SOLUTION INTRAMUSCULAR; INTRAVENOUS; SUBCUTANEOUS at 21:07

## 2018-04-07 RX ADMIN — PROMETHAZINE HYDROCHLORIDE PRN MG: 25 INJECTION, SOLUTION INTRAMUSCULAR; INTRAVENOUS at 11:34

## 2018-04-07 RX ADMIN — PROMETHAZINE HYDROCHLORIDE PRN MG: 25 INJECTION, SOLUTION INTRAMUSCULAR; INTRAVENOUS at 16:35

## 2018-04-07 RX ADMIN — POTASSIUM CHLORIDE AND SODIUM CHLORIDE SCH MLS/HR: 450; 150 INJECTION, SOLUTION INTRAVENOUS at 09:45

## 2018-04-07 RX ADMIN — SODIUM CHLORIDE SCH: 9 INJECTION, SOLUTION INTRAMUSCULAR; INTRAVENOUS; SUBCUTANEOUS at 14:29

## 2018-04-07 RX ADMIN — MORPHINE SULFATE PRN MG: 2 INJECTION, SOLUTION INTRAMUSCULAR; INTRAVENOUS at 10:12

## 2018-04-07 NOTE — RAD
HISTORY:  NG tube placement



Study: Two views of the abdomen



Comparison: CT same day



Findings:

Enteric tube terminates in the stomach. Distended bowel loops are noted. The lung bases are clear. Pr
ior cholecystectomy is noted.





IMPRESSION:

 

1. Enteric tube terminates in the stomach.







Reported By:Electronically Signed by ESMER BUCKLEY MD at 4/7/2018 5:53:55 PM

## 2018-04-07 NOTE — CT
CT abdomen and pelvis without contrast



Indication: Abdominal pain with nausea, vomiting and diarrhea



Comparison: 01/22/2018 CT



Technique: Helical images through the abdomen and pelvis without contrast. Coronal and sagittal refor
mats provided.



Findings: Review of bone windows demonstrates spine degenerative change without destructive osseous l
esion. Sclerotic lesion at L1 is unchanged from the prior CT from 05/09/2014 is most compatible with 
a hemangioma.



Limited images through the lower chest shows no acute abnormality.



Abdomen: The gallbladder is absent. The liver, spleen, adrenal glands and pancreas show no acute abno
rmality. The stomach and duodenum show no acute abnormality.



The colon and appendix are normal. Scattered vascular calcifications noted. Retro aortic left renal v
ein noted.



The kidneys are normal without hydroureteronephrosis.



There is mild small bowel dilatation and in the left abdomen see coronal image 23. Distally the small
 bowel is collapsed. Transition point is best seen on coronal image 13 through 22 in the left abdomen
. Mild increased vascular markings noted.



Few vascular calcifications noted with retro aortic left renal vein seen.



Pelvis: The urinary bladder and rectum are normal. Uterus is absent. No adnexal region lesions seen.



Impression:

1. Developing small bowel obstruction suspected, with transition point in the left abdomen. The bowel
 is minimally inflamed, and inflammatory bowel disease such as Crohn's disease is possible. Infectiou
s enteritis is possible.



2. No other acute abnormality identified.



Reported By:Electronically Signed by PAVAN SHUKLA MD at 4/7/2018 6:10:33 AM

## 2018-04-08 LAB
ALBUMIN SERPL BCP-MCNC: 2.8 G/DL (ref 3.4–5)
ALP SERPL-CCNC: 84 UNITS/L (ref 46–116)
ALT SERPL W P-5'-P-CCNC: 12 UNITS/L (ref 12–78)
AST SERPL W P-5'-P-CCNC: 10 UNITS/L (ref 15–37)
BASOPHILS # BLD AUTO: 0 X10^3/UL (ref 0–0.1)
BASOPHILS NFR BLD AUTO: 0.3 % (ref 0.2–1)
BUN SERPL-MCNC: 23 MG/DL (ref 7–18)
CALCIUM ALBUM COR SERPL-SCNC: 145 MMOL/L (ref 136–145)
CALCIUM ALBUM COR SERPL-SCNC: 9.1 MG/DL (ref 8.5–10.1)
CALCIUM SERPL-MCNC: 8.1 MG/DL (ref 8.5–10.1)
CHLORIDE SERPL-SCNC: 110 MMOL/L (ref 98–107)
CO2 SERPL-SCNC: 25.9 MMOL/L (ref 21–32)
CREAT SERPL-MCNC: 0.86 MG/DL (ref 0.55–1.02)
EGFR  BLACK RACES: > 60 (ref 60–?)
EOSINOPHIL # BLD AUTO: 0.1 X10^3/UL (ref 0–0.2)
EOSINOPHIL NFR BLD AUTO: 0.6 % (ref 0.9–2.9)
ERYTHROCYTE [DISTWIDTH] IN BLOOD BY AUTOMATED COUNT: 14.2 % (ref 11.6–16.5)
HCT VFR BLD AUTO: 39.4 % (ref 36–47)
HGB BLD-MCNC: 13.1 G/DL (ref 12–16)
LYMPHOCYTES # BLD AUTO: 2.5 X10^3/UL (ref 1.3–2.9)
LYMPHOCYTES NFR BLD AUTO: 22.2 % (ref 21–51)
MAGNESIUM SERPL-MCNC: 1.7 MG/DL (ref 1.7–2.9)
MCH RBC QN AUTO: 26 PG (ref 27–34)
MCHC RBC AUTO-ENTMCNC: 33.1 G/DL (ref 33–35)
MCV RBC AUTO: 78.3 FL (ref 80–100)
MONOCYTES # BLD AUTO: 0.6 X10^3/UL (ref 0.3–0.8)
MONOCYTES NFR BLD AUTO: 4.8 % (ref 0–13)
NEUTROPHILS # BLD AUTO: 8.2 X10^3/UL (ref 2.2–4.8)
NEUTROPHILS NFR BLD AUTO: 72.1 % (ref 42–75)
PLATELET # BLD: 190 X10^3/UL (ref 150–450)
PMV BLD AUTO: 10.2 FL (ref 7.4–11)
PROT SERPL-MCNC: 6.5 G/DL (ref 6.4–8.2)
RBC # BLD AUTO: 5.03 X10^6/UL (ref 3.5–5.4)
SODIUM SERPL-SCNC: 144 MMOL/L (ref 136–145)
WBC NRBC COR # BLD AUTO: 11.5 X10^3/UL (ref 3.6–10)

## 2018-04-08 RX ADMIN — POTASSIUM CHLORIDE AND SODIUM CHLORIDE SCH MLS/HR: 450; 150 INJECTION, SOLUTION INTRAVENOUS at 07:15

## 2018-04-08 RX ADMIN — MORPHINE SULFATE PRN MG: 2 INJECTION, SOLUTION INTRAMUSCULAR; INTRAVENOUS at 06:51

## 2018-04-08 RX ADMIN — SODIUM CHLORIDE SCH: 9 INJECTION, SOLUTION INTRAMUSCULAR; INTRAVENOUS; SUBCUTANEOUS at 13:34

## 2018-04-08 RX ADMIN — CEFTRIAXONE SCH MLS/HR: 1 INJECTION, POWDER, FOR SOLUTION INTRAMUSCULAR; INTRAVENOUS at 08:02

## 2018-04-08 RX ADMIN — POTASSIUM CHLORIDE AND SODIUM CHLORIDE SCH MLS/HR: 450; 150 INJECTION, SOLUTION INTRAVENOUS at 00:31

## 2018-04-08 RX ADMIN — SODIUM CHLORIDE SCH: 9 INJECTION, SOLUTION INTRAMUSCULAR; INTRAVENOUS; SUBCUTANEOUS at 22:21

## 2018-04-08 RX ADMIN — MORPHINE SULFATE PRN MG: 2 INJECTION, SOLUTION INTRAMUSCULAR; INTRAVENOUS at 23:44

## 2018-04-08 RX ADMIN — SODIUM CHLORIDE SCH: 9 INJECTION, SOLUTION INTRAMUSCULAR; INTRAVENOUS; SUBCUTANEOUS at 06:05

## 2018-04-08 RX ADMIN — POTASSIUM CHLORIDE AND SODIUM CHLORIDE SCH MLS/HR: 450; 150 INJECTION, SOLUTION INTRAVENOUS at 16:51

## 2018-04-08 RX ADMIN — HYDROMORPHONE HYDROCHLORIDE PRN MG: 2 INJECTION, SOLUTION INTRAMUSCULAR; INTRAVENOUS; SUBCUTANEOUS at 10:01

## 2018-04-08 RX ADMIN — MORPHINE SULFATE PRN MG: 2 INJECTION, SOLUTION INTRAMUSCULAR; INTRAVENOUS at 17:39

## 2018-04-08 RX ADMIN — POTASSIUM CHLORIDE AND SODIUM CHLORIDE SCH MLS/HR: 450; 150 INJECTION, SOLUTION INTRAVENOUS at 23:44

## 2018-04-08 NOTE — RAD
Examination: Abdomen series with PA chest



History: Abdominal pain



Findings: PA chest demonstrates normal heart size with clear lungs and pleural spaces. Nasogastric tu
be passes below the diaphragm into the stomach.



Subsequent supine and erect views of abdomen demonstrate mild gas and fluid distention of bowel, pred
ominantly colon. Multiple short air-fluid levels are noted in the colon. No free air, mass or ascites
 is detected. Nasogastric tube is present in the stomach. There are surgical clips from cholecystecto
my.



Impression:



1. Negative PA chest.



2. Described intestinal gas pattern is consistent with nonobstructive process such as diarrhea, or en
terocolitis. A distal obstruction is considered less likely. No complicating perforation is identifie
d.



Reported By:Electronically Signed by CLARKE MALAGON MD at 4/8/2018 8:26:43 AM

## 2018-04-09 LAB
ALBUMIN SERPL BCP-MCNC: 2.6 G/DL (ref 3.4–5)
ALP SERPL-CCNC: 74 UNITS/L (ref 46–116)
ALT SERPL W P-5'-P-CCNC: 12 UNITS/L (ref 12–78)
AST SERPL W P-5'-P-CCNC: 8 UNITS/L (ref 15–37)
BASOPHILS # BLD AUTO: 0 X10^3/UL (ref 0–0.1)
BASOPHILS NFR BLD AUTO: 0.5 % (ref 0.2–1)
BUN SERPL-MCNC: 19 MG/DL (ref 7–18)
CALCIUM ALBUM COR SERPL-SCNC: (no result) MMOL/L (ref 136–145)
CALCIUM ALBUM COR SERPL-SCNC: 9.4 MG/DL (ref 8.5–10.1)
CALCIUM SERPL-MCNC: 8.3 MG/DL (ref 8.5–10.1)
CHLORIDE SERPL-SCNC: 108 MMOL/L (ref 98–107)
CO2 SERPL-SCNC: 31.9 MMOL/L (ref 21–32)
CREAT SERPL-MCNC: 0.78 MG/DL (ref 0.55–1.02)
EGFR  BLACK RACES: > 60 (ref 60–?)
EOSINOPHIL # BLD AUTO: 0.1 X10^3/UL (ref 0–0.2)
EOSINOPHIL NFR BLD AUTO: 1.3 % (ref 0.9–2.9)
ERYTHROCYTE [DISTWIDTH] IN BLOOD BY AUTOMATED COUNT: 14.3 % (ref 11.6–16.5)
HCT VFR BLD AUTO: 37.1 % (ref 36–47)
HGB BLD-MCNC: 12.5 G/DL (ref 12–16)
LYMPHOCYTES # BLD AUTO: 3.8 X10^3/UL (ref 1.3–2.9)
LYMPHOCYTES NFR BLD AUTO: 37.8 % (ref 21–51)
MCH RBC QN AUTO: 26.3 PG (ref 27–34)
MCHC RBC AUTO-ENTMCNC: 33.7 G/DL (ref 33–35)
MCV RBC AUTO: 78.2 FL (ref 80–100)
MONOCYTES # BLD AUTO: 0.5 X10^3/UL (ref 0.3–0.8)
MONOCYTES NFR BLD AUTO: 5.3 % (ref 0–13)
NEUTROPHILS # BLD AUTO: 5.6 X10^3/UL (ref 2.2–4.8)
NEUTROPHILS NFR BLD AUTO: 55.1 % (ref 42–75)
PLATELET # BLD: 169 X10^3/UL (ref 150–450)
PMV BLD AUTO: 10.2 FL (ref 7.4–11)
PROT SERPL-MCNC: 6.1 G/DL (ref 6.4–8.2)
RBC # BLD AUTO: 4.75 X10^6/UL (ref 3.5–5.4)
SODIUM SERPL-SCNC: 146 MMOL/L (ref 136–145)
WBC NRBC COR # BLD AUTO: 10.1 X10^3/UL (ref 3.6–10)

## 2018-04-09 RX ADMIN — AMLODIPINE BESYLATE SCH MG: 10 TABLET ORAL at 20:37

## 2018-04-09 RX ADMIN — OXYCODONE HYDROCHLORIDE AND ACETAMINOPHEN PRN EA: 5; 325 TABLET ORAL at 13:18

## 2018-04-09 RX ADMIN — LISINOPRIL SCH MG: 20 TABLET ORAL at 20:37

## 2018-04-09 RX ADMIN — GABAPENTIN SCH MG: 400 CAPSULE ORAL at 14:08

## 2018-04-09 RX ADMIN — CEFTRIAXONE SCH MLS/HR: 1 INJECTION, POWDER, FOR SOLUTION INTRAMUSCULAR; INTRAVENOUS at 07:59

## 2018-04-09 RX ADMIN — SODIUM CHLORIDE SCH: 9 INJECTION, SOLUTION INTRAMUSCULAR; INTRAVENOUS; SUBCUTANEOUS at 20:59

## 2018-04-09 RX ADMIN — BUDESONIDE SCH EA: 0.5 INHALANT RESPIRATORY (INHALATION) at 20:46

## 2018-04-09 RX ADMIN — OXYCODONE HYDROCHLORIDE AND ACETAMINOPHEN PRN EA: 5; 325 TABLET ORAL at 20:36

## 2018-04-09 RX ADMIN — CARISOPRODOL SCH MG: 350 TABLET ORAL at 20:37

## 2018-04-09 RX ADMIN — ALBUTEROL SULFATE SCH EA: 2.5 SOLUTION RESPIRATORY (INHALATION) at 20:46

## 2018-04-09 RX ADMIN — POTASSIUM CHLORIDE AND SODIUM CHLORIDE SCH MLS/HR: 450; 150 INJECTION, SOLUTION INTRAVENOUS at 20:37

## 2018-04-09 RX ADMIN — SODIUM CHLORIDE SCH: 9 INJECTION, SOLUTION INTRAMUSCULAR; INTRAVENOUS; SUBCUTANEOUS at 13:16

## 2018-04-09 RX ADMIN — POTASSIUM CHLORIDE AND SODIUM CHLORIDE SCH: 450; 150 INJECTION, SOLUTION INTRAVENOUS at 16:16

## 2018-04-09 RX ADMIN — POTASSIUM CHLORIDE AND SODIUM CHLORIDE SCH MLS/HR: 450; 150 INJECTION, SOLUTION INTRAVENOUS at 07:47

## 2018-04-09 RX ADMIN — GABAPENTIN SCH MG: 400 CAPSULE ORAL at 20:59

## 2018-04-09 RX ADMIN — SODIUM CHLORIDE SCH: 9 INJECTION, SOLUTION INTRAMUSCULAR; INTRAVENOUS; SUBCUTANEOUS at 05:51

## 2018-04-09 NOTE — RAD
HISTORY:  Nausea, vomiting, diarrhea



Study:  Small-bowel follow-through



Comparison: CT abdomen pelvis 04/07/2018







Findings:



Examination of the small bowel demonstrated normal caliber and transit time. There is rapid passage o
f barium from the small bowel into the colon. The terminal ileum is normal. There is no evidence for 
complete or partial small bowel obstruction. However there is mucosal thickening, mural thickening, a
nd spiculation in the descending duodenum, transverse duodenum, and proximal most jejunum. This could
 be on the basis of inflammatory bowel disease, infectious/inflammatory enteritis, inflammation in th
e adjacent pancreas, or less likely mural hemorrhage. The remainder of the small bowel is unaffected.




IMPRESSION:

 

No evidence for partial or complete small bowel obstruction



Mucosal thickening, mural thickening, and spiculation involving the duodenum and proximal most jejunu
m. Some differential diagnostic possibilities given above







Reported By:Electronically Signed by HANG QUINTEROS MD at 4/9/2018 1:32:54 PM

## 2018-04-09 NOTE — PCM.PROG
Progress Note





- Progress Note for Day of


Date: 04/09/18





- Subjective


Subjective: CONTINUED CO ABDOMINAL PAIN TO UPPER ABD. PT NPO THIS AM FOR SMALL 

BOWEL SERIES. PT DENIES ANY NAUSEA OR DIARRHEA. NG TUBE D/C LAST NIGHT





- Past Medical Family Social History


Past Med/Fam/Surg Hx: No changes since H&P


Allergies: 


Allergies





epinephrine [From Primatene Mist] Allergy (Verified 04/04/18 08:26)


 











- Review of Systems


ROS: No change since H&P





- Vital Signs and I&O's


Vital Signs: 


 





Temperature                      98.0 F


Pulse Rate [Right Brachial]      61


Pulse Rate [Apical]              61


Pulse Rate                       92


Respiratory Rate                 20


Blood Pressure [Right Arm]       171/76


Blood Pressure [Left Arm]        186/76


Blood Pressure                   145/79


O2 Sat by Pulse Oximetry         97








Intake and Output: 


 Intake & Output











 04/07/18 04/08/18 04/09/18 04/10/18





 11:59 11:59 11:59 11:59


 


Intake Total  579 2381 


 


Output Total  2360 850 


 


Balance  -1781 1531 














- Physical Exam


Oriented: Normal


Eyes: Normal


Ear: Normal


Nose: Normal


Throat: Normal


Respiratory: Normal


: Normal


Auscultation: Bowel Sounds: Decreased


Tenderness: RUQ, LUQ, Epigastric


Skin: Normal


Musculoskeletal: Back:Lumbar


Mood Description: Calm


Speech Pattern: Clear, Appropriate





- Laboratory and Diagnostics


Result Diagrams: 


 04/09/18 05:21





 04/09/18 05:21


Labs: 


 





04/07/18 11:06   Stool   Stool Culture - Preliminary


04/07/18 11:06   Stool    - Final





 Laboratory











WBC  10.1 X10^3/uL (3.6-10.0)  H  04/09/18  05:21    


 


RBC  4.75 X10^6/uL (3.5-5.4)   04/09/18  05:21    


 


Hgb  12.5 g/dL (12.0-16.0)   04/09/18  05:21    


 


Hct  37.1 % (36.0-47.0)   04/09/18  05:21    


 


MCV  78.2 fL (80.0-100.0)  L  04/09/18  05:21    


 


MCH  26.3 pg (27.0-34.0)  L  04/09/18  05:21    


 


MCHC  33.7 g/dL (33.0-35.0)   04/09/18  05:21    


 


RDW  14.3 % (11.6-16.5)   04/09/18  05:21    


 


Plt Count  169 X10^3/uL (150.0-450.0)   04/09/18  05:21    


 


MPV  10.2 fL (7.4-11.0)   04/09/18  05:21    


 


Neut % (Auto)  55.1 % (42.0-75.0)   04/09/18  05:21    


 


Lymph % (Auto)  37.8 % (21.0-51.0)   04/09/18  05:21    


 


Mono % (Auto)  5.3 % (0.0-13.0)   04/09/18  05:21    


 


Eos % (Auto)  1.3 % (0.9-2.9)   04/09/18  05:21    


 


Baso % (Auto)  0.5 % (0.2-1.0)   04/09/18  05:21    


 


Neut # (Auto)  5.6 x10^3/uL (2.2-4.8)  H  04/09/18  05:21    


 


Lymph # (Auto)  3.8 X10^3/uL (1.3-2.9)  H  04/09/18  05:21    


 


Mono # (Auto)  0.5 x10^3/uL (0.3-0.8)   04/09/18  05:21    


 


Eos # (Auto)  0.1 x10^3/uL (0.0-0.2)   04/09/18  05:21    


 


Baso # (Auto)  0.0 X10^3/uL (0.0-0.1)   04/09/18  05:21    


 


Absolute Nucleated RBC  0.0 /100WBC  04/09/18  05:21    


 


Sodium  146 mmol/L (136-145)  H  04/09/18  05:21    


 


Corrected Sodium  TNP   04/09/18  05:21    


 


Potassium  3.6 mmol/L (3.5-5.1)   04/09/18  05:21    


 


Chloride  108 mmol/L ()  H  04/09/18  05:21    


 


Carbon Dioxide  31.9 mmol/L (21-32)   04/09/18  05:21    


 


BUN  19 mg/dL (7-18)  H  04/09/18  05:21    


 


Creatinine  0.78 mg/dL (0.55-1.02)   04/09/18  05:21    


 


Est GFR (MDRD) Af Amer  > 60  (>60)   04/09/18  05:21    


 


Est GFR (MDRD) Non-Af  > 60  (>60)   04/09/18  05:21    


 


Glucose  88 mg/dL (65-99)   04/09/18  05:21    


 


POC Glucose (mg/dL)  79 mg/dL (65-99)   04/09/18  11:32    


 


Calcium  8.3 mg/dL (8.5-10.1)  L  04/09/18  05:21    


 


Corrected Calcium  9.4 mg/dL (8.5-10.1)   04/09/18  05:21    


 


Magnesium  1.7 mg/dL (1.7-2.9)   04/08/18  05:10    


 


Total Bilirubin  0.40 mg/dL (0.2-1.0)   04/09/18  05:21    


 


AST  8 Units/L (15-37)  L  04/09/18  05:21    


 


ALT  12 Units/L (12-78)   04/09/18  05:21    


 


Alkaline Phosphatase  74 Units/L ()   04/09/18  05:21    


 


Total Protein  6.1 g/dL (6.4-8.2)  L  04/09/18  05:21    


 


Albumin  2.6 g/dL (3.4-5.0)  L  04/09/18  05:21    


 


Globulin  3.5 g/dL (2.5-4.5)   04/09/18  05:21    


 


Albumin/Globulin Ratio  0.7 Ratio (1.1-2.1)  L  04/09/18  05:21    


 


Amylase  58 Units/L ()   04/07/18  05:28    


 


Lipase  123 Units/L ()   04/07/18  05:28    


 


HCG, Qual  Negative <10 mIU/mL  04/07/18  05:28    


 


Specimen Type  Clean catch urine   04/07/18  05:52    


 


Urine Color  Yellow  (YELLOW)   04/07/18  05:52    


 


Urine Appearance  Slightly hazy  (CLEAR)   04/07/18  05:52    


 


Urine pH  6.0  (5.0 - 8.0)   04/07/18  05:52    


 


Ur Specific Gravity  1.020  (1.000-1.030)   04/07/18  05:52    


 


Urine Protein  2+  (NEGATIVE)   04/07/18  05:52    


 


Urine Glucose (UA)  Negative  (NEGATIVE)   04/07/18  05:52    


 


Urine Ketones  Negative  (NEGATIVE)   04/07/18  05:52    


 


Urine Occult Blood  Negative  (NEGATIVE)   04/07/18  05:52    


 


Urine Nitrite  Negative  (NEGATIVE)   04/07/18  05:52    


 


Urine Bilirubin  Negative  (NEGATIVE)   04/07/18  05:52    


 


Urine Urobilinogen  Normal  (NORMAL)   04/07/18  05:52    


 


Ur Leukocyte Esterase  Negative  (NEGATIVE)   04/07/18  05:52    


 


Urine RBC  None seen /HPF (NONE SEEN)   04/07/18  05:52    


 


Urine WBC  0-2 /HPF (NONE SEEN)   04/07/18  05:52    


 


Ur Squamous Epith Cells  Few /HPF (NEGATIVE)   04/07/18  05:52    


 


Urine Bacteria  Trace /HPF (NEGATIVE)   04/07/18  05:52    


 


Granular Casts  Few /LPF (NEGATIVE)   04/07/18  05:52    


 


Coarse Granular Casts  Moderate /HPF (NEGATIVE)   04/07/18  05:52    


 


Ur Culture Indicated?  No/not indicated   04/07/18  05:52    


 


Stool Description  50g,liquid,yellow   04/07/18  11:06    


 


Stl Occult Blood (IFOB)  Negative  (NEGATIVE)   04/07/18  11:06    


 


Stool for White Cells  Negative  (NEGATIVE)   04/07/18  11:06    


 


Stl C. diff Tox B Gene  Negative  (NEGATIVE)   04/07/18  11:06    


 


Stl C. diff 027-NAP1-BI  Negative  (NEGATIVE)   04/07/18  11:06    


 


Cryptosporid parvum Ag  Negative  (NEGATIVE)   04/07/18  11:06    


 


E. histolytica Antigen  Negative  (NEGATIVE)   04/07/18  11:06    


 


Giardia lamblia Ag  Negative  (NEGATIVE)   04/07/18  11:06    














- Plan


(1) Abdominal pain


Status: Acute   


Plan: CONTINUE PAIN AND NAUSEA CONTROL.  IV HYDRATION, SMALL BOWEL SERIES 

ORDERED THIS AM.  PLAN TO ADVANCE DIET AS TOLERATED. REPEAT AM LABS   





(2) Acute gastroenteritis


Status: Acute   





(3) Hypokalemia


Status: Acute   





(4) Abdominal pain, vomiting, and diarrhea


Status: Acute

## 2018-04-10 VITALS — DIASTOLIC BLOOD PRESSURE: 79 MMHG | SYSTOLIC BLOOD PRESSURE: 148 MMHG

## 2018-04-10 LAB
ALBUMIN SERPL BCP-MCNC: 2.5 G/DL (ref 3.4–5)
ALP SERPL-CCNC: 70 UNITS/L (ref 46–116)
ALT SERPL W P-5'-P-CCNC: 13 UNITS/L (ref 12–78)
AST SERPL W P-5'-P-CCNC: 11 UNITS/L (ref 15–37)
BASOPHILS # BLD AUTO: 0 X10^3/UL (ref 0–0.1)
BASOPHILS NFR BLD AUTO: 0.5 % (ref 0.2–1)
BUN SERPL-MCNC: 16 MG/DL (ref 7–18)
CALCIUM ALBUM COR SERPL-SCNC: 147 MMOL/L (ref 136–145)
CALCIUM ALBUM COR SERPL-SCNC: 9.2 MG/DL (ref 8.5–10.1)
CALCIUM SERPL-MCNC: 8 MG/DL (ref 8.5–10.1)
CHLORIDE SERPL-SCNC: 110 MMOL/L (ref 98–107)
CO2 SERPL-SCNC: 28.5 MMOL/L (ref 21–32)
CREAT SERPL-MCNC: 0.74 MG/DL (ref 0.55–1.02)
EGFR  BLACK RACES: > 60 (ref 60–?)
EOSINOPHIL # BLD AUTO: 0.2 X10^3/UL (ref 0–0.2)
EOSINOPHIL NFR BLD AUTO: 1.7 % (ref 0.9–2.9)
ERYTHROCYTE [DISTWIDTH] IN BLOOD BY AUTOMATED COUNT: 13.9 % (ref 11.6–16.5)
HCT VFR BLD AUTO: 35.1 % (ref 36–47)
HGB BLD-MCNC: 11.7 G/DL (ref 12–16)
LYMPHOCYTES # BLD AUTO: 2.5 X10^3/UL (ref 1.3–2.9)
LYMPHOCYTES NFR BLD AUTO: 28 % (ref 21–51)
MCH RBC QN AUTO: 26.1 PG (ref 27–34)
MCHC RBC AUTO-ENTMCNC: 33.4 G/DL (ref 33–35)
MCV RBC AUTO: 78.1 FL (ref 80–100)
MONOCYTES # BLD AUTO: 0.5 X10^3/UL (ref 0.3–0.8)
MONOCYTES NFR BLD AUTO: 5.2 % (ref 0–13)
NEUTROPHILS # BLD AUTO: 5.8 X10^3/UL (ref 2.2–4.8)
NEUTROPHILS NFR BLD AUTO: 64.6 % (ref 42–75)
PLATELET # BLD: 163 X10^3/UL (ref 150–450)
PMV BLD AUTO: 9.9 FL (ref 7.4–11)
PROT SERPL-MCNC: 5.7 G/DL (ref 6.4–8.2)
RBC # BLD AUTO: 4.5 X10^6/UL (ref 3.5–5.4)
SODIUM SERPL-SCNC: 145 MMOL/L (ref 136–145)
WBC NRBC COR # BLD AUTO: 9 X10^3/UL (ref 3.6–10)

## 2018-04-10 RX ADMIN — GABAPENTIN SCH MG: 400 CAPSULE ORAL at 13:14

## 2018-04-10 RX ADMIN — BUDESONIDE SCH EA: 0.5 INHALANT RESPIRATORY (INHALATION) at 08:28

## 2018-04-10 RX ADMIN — POTASSIUM CHLORIDE AND SODIUM CHLORIDE SCH MLS/HR: 450; 150 INJECTION, SOLUTION INTRAVENOUS at 05:02

## 2018-04-10 RX ADMIN — POTASSIUM CHLORIDE AND SODIUM CHLORIDE SCH: 450; 150 INJECTION, SOLUTION INTRAVENOUS at 06:58

## 2018-04-10 RX ADMIN — SODIUM CHLORIDE SCH: 9 INJECTION, SOLUTION INTRAMUSCULAR; INTRAVENOUS; SUBCUTANEOUS at 13:14

## 2018-04-10 RX ADMIN — SODIUM CHLORIDE SCH: 9 INJECTION, SOLUTION INTRAMUSCULAR; INTRAVENOUS; SUBCUTANEOUS at 05:06

## 2018-04-10 RX ADMIN — CARISOPRODOL SCH MG: 350 TABLET ORAL at 09:05

## 2018-04-10 RX ADMIN — OXYCODONE HYDROCHLORIDE AND ACETAMINOPHEN PRN EA: 5; 325 TABLET ORAL at 10:30

## 2018-04-10 RX ADMIN — OXYCODONE HYDROCHLORIDE AND ACETAMINOPHEN PRN EA: 5; 325 TABLET ORAL at 15:05

## 2018-04-10 RX ADMIN — POTASSIUM CHLORIDE AND SODIUM CHLORIDE SCH: 450; 150 INJECTION, SOLUTION INTRAVENOUS at 07:02

## 2018-04-10 RX ADMIN — GABAPENTIN SCH MG: 400 CAPSULE ORAL at 05:01

## 2018-04-10 RX ADMIN — ALBUTEROL SULFATE SCH EA: 2.5 SOLUTION RESPIRATORY (INHALATION) at 08:28

## 2018-04-10 RX ADMIN — LISINOPRIL SCH MG: 20 TABLET ORAL at 09:04

## 2018-04-10 RX ADMIN — POTASSIUM CHLORIDE AND SODIUM CHLORIDE SCH: 450; 150 INJECTION, SOLUTION INTRAVENOUS at 16:28

## 2018-04-10 RX ADMIN — OXYCODONE HYDROCHLORIDE AND ACETAMINOPHEN PRN EA: 5; 325 TABLET ORAL at 05:01

## 2018-04-10 RX ADMIN — CEFTRIAXONE SCH MLS/HR: 1 INJECTION, POWDER, FOR SOLUTION INTRAMUSCULAR; INTRAVENOUS at 09:04

## 2018-04-10 RX ADMIN — AMLODIPINE BESYLATE SCH MG: 10 TABLET ORAL at 09:05

## 2018-04-10 NOTE — PCM.PROG
Progress Note





- Progress Note for Day of


Date: 04/10/18





- Subjective


Subjective: CONTINUED CO ABDOMINAL PAIN TO UPPER ABD, BUT IMPROVING.  PT STATES 

SHE HAS SOME NAUSEA. PLAN TO ADVANCE DIET, CIPRO 400MG IV.  REPEAT AM LABS, KUB





- Past Medical Family Social History


Past Med/Fam/Surg Hx: No changes since H&P


Allergies: 


Allergies





epinephrine [From Primatene Mist] Allergy (Verified 04/04/18 08:26)


 











- Review of Systems


ROS: No change since H&P





- Vital Signs and I&O's


Vital Signs: 


 





Temperature                      98.1 F


Pulse Rate [Right Brachial]      70


Pulse Rate [Apical]              64


Pulse Rate                       61


Respiratory Rate                 20


Blood Pressure [Right Arm]       120/58


Blood Pressure [Left Arm]        139/66


Blood Pressure                   145/79


O2 Sat by Pulse Oximetry         100








Intake and Output: 


 Intake & Output











 04/08/18 04/09/18 04/10/18 04/11/18





 11:59 11:59 11:59 11:59


 


Intake Total 579 2381 2320 


 


Output Total 2360 850  


 


Balance -1781 1531 2320 














- Physical Exam


Oriented: Normal


Eyes: Normal


Ear: Normal


Nose: Normal


Throat: Normal


Respiratory: Normal


Cardiovascular: Normal


: Normal


Auscultation: Bowel Sounds: Decreased


Tenderness: RUQ, LUQ, Epigastric


Skin: Normal


Musculoskeletal: Back:Lumbar


Mood Description: Calm


Speech Pattern: Clear, Appropriate





- Laboratory and Diagnostics


Result Diagrams: 


 04/10/18 05:15





 04/10/18 05:15


Labs: 


 





04/07/18 11:06   Stool   Stool Culture - Final


04/07/18 11:06   Stool    - Final





 Laboratory











WBC  9.0 X10^3/uL (3.6-10.0)   04/10/18  05:15    


 


RBC  4.50 X10^6/uL (3.5-5.4)   04/10/18  05:15    


 


Hgb  11.7 g/dL (12.0-16.0)  L  04/10/18  05:15    


 


Hct  35.1 % (36.0-47.0)  L  04/10/18  05:15    


 


MCV  78.1 fL (80.0-100.0)  L  04/10/18  05:15    


 


MCH  26.1 pg (27.0-34.0)  L  04/10/18  05:15    


 


MCHC  33.4 g/dL (33.0-35.0)   04/10/18  05:15    


 


RDW  13.9 % (11.6-16.5)   04/10/18  05:15    


 


Plt Count  163 X10^3/uL (150.0-450.0)   04/10/18  05:15    


 


MPV  9.9 fL (7.4-11.0)   04/10/18  05:15    


 


Neut % (Auto)  64.6 % (42.0-75.0)   04/10/18  05:15    


 


Lymph % (Auto)  28.0 % (21.0-51.0)   04/10/18  05:15    


 


Mono % (Auto)  5.2 % (0.0-13.0)   04/10/18  05:15    


 


Eos % (Auto)  1.7 % (0.9-2.9)   04/10/18  05:15    


 


Baso % (Auto)  0.5 % (0.2-1.0)   04/10/18  05:15    


 


Neut # (Auto)  5.8 x10^3/uL (2.2-4.8)  H  04/10/18  05:15    


 


Lymph # (Auto)  2.5 X10^3/uL (1.3-2.9)   04/10/18  05:15    


 


Mono # (Auto)  0.5 x10^3/uL (0.3-0.8)   04/10/18  05:15    


 


Eos # (Auto)  0.2 x10^3/uL (0.0-0.2)   04/10/18  05:15    


 


Baso # (Auto)  0.0 X10^3/uL (0.0-0.1)   04/10/18  05:15    


 


Absolute Nucleated RBC  0.0 /100WBC  04/10/18  05:15    


 


Sodium  145 mmol/L (136-145)   04/10/18  05:15    


 


Corrected Sodium  147 mmol/L (136-145)  H  04/10/18  05:15    


 


Potassium  3.8 mmol/L (3.5-5.1)   04/10/18  05:15    


 


Chloride  110 mmol/L ()  H  04/10/18  05:15    


 


Carbon Dioxide  28.5 mmol/L (21-32)   04/10/18  05:15    


 


BUN  16 mg/dL (7-18)   04/10/18  05:15    


 


Creatinine  0.74 mg/dL (0.55-1.02)   04/10/18  05:15    


 


Est GFR (MDRD) Af Amer  > 60  (>60)   04/10/18  05:15    


 


Est GFR (MDRD) Non-Af  > 60  (>60)   04/10/18  05:15    


 


Glucose  166 mg/dL (65-99)  H  04/10/18  05:15    


 


POC Glucose (mg/dL)  159 mg/dL (65-99)  H  04/10/18  11:13    


 


Calcium  8.0 mg/dL (8.5-10.1)  L  04/10/18  05:15    


 


Corrected Calcium  9.2 mg/dL (8.5-10.1)   04/10/18  05:15    


 


Magnesium  1.7 mg/dL (1.7-2.9)   04/08/18  05:10    


 


Total Bilirubin  0.20 mg/dL (0.2-1.0)   04/10/18  05:15    


 


AST  11 Units/L (15-37)  L  04/10/18  05:15    


 


ALT  13 Units/L (12-78)   04/10/18  05:15    


 


Alkaline Phosphatase  70 Units/L ()   04/10/18  05:15    


 


Total Protein  5.7 g/dL (6.4-8.2)  L  04/10/18  05:15    


 


Albumin  2.5 g/dL (3.4-5.0)  L  04/10/18  05:15    


 


Globulin  3.2 g/dL (2.5-4.5)   04/10/18  05:15    


 


Albumin/Globulin Ratio  0.8 Ratio (1.1-2.1)  L  04/10/18  05:15    


 


Amylase  58 Units/L ()   04/07/18  05:28    


 


Lipase  123 Units/L ()   04/07/18  05:28    


 


HCG, Qual  Negative <10 mIU/mL  04/07/18  05:28    


 


Specimen Type  Clean catch urine   04/07/18  05:52    


 


Urine Color  Yellow  (YELLOW)   04/07/18  05:52    


 


Urine Appearance  Slightly hazy  (CLEAR)   04/07/18  05:52    


 


Urine pH  6.0  (5.0 - 8.0)   04/07/18  05:52    


 


Ur Specific Gravity  1.020  (1.000-1.030)   04/07/18  05:52    


 


Urine Protein  2+  (NEGATIVE)   04/07/18  05:52    


 


Urine Glucose (UA)  Negative  (NEGATIVE)   04/07/18  05:52    


 


Urine Ketones  Negative  (NEGATIVE)   04/07/18  05:52    


 


Urine Occult Blood  Negative  (NEGATIVE)   04/07/18  05:52    


 


Urine Nitrite  Negative  (NEGATIVE)   04/07/18  05:52    


 


Urine Bilirubin  Negative  (NEGATIVE)   04/07/18  05:52    


 


Urine Urobilinogen  Normal  (NORMAL)   04/07/18  05:52    


 


Ur Leukocyte Esterase  Negative  (NEGATIVE)   04/07/18  05:52    


 


Urine RBC  None seen /HPF (NONE SEEN)   04/07/18  05:52    


 


Urine WBC  0-2 /HPF (NONE SEEN)   04/07/18  05:52    


 


Ur Squamous Epith Cells  Few /HPF (NEGATIVE)   04/07/18  05:52    


 


Urine Bacteria  Trace /HPF (NEGATIVE)   04/07/18  05:52    


 


Granular Casts  Few /LPF (NEGATIVE)   04/07/18  05:52    


 


Coarse Granular Casts  Moderate /HPF (NEGATIVE)   04/07/18  05:52    


 


Ur Culture Indicated?  No/not indicated   04/07/18  05:52    


 


Stool Description  50g,liquid,yellow   04/07/18  11:06    


 


Stl Occult Blood (IFOB)  Negative  (NEGATIVE)   04/07/18  11:06    


 


Stool for White Cells  Negative  (NEGATIVE)   04/07/18  11:06    


 


Stl C. diff Tox B Gene  Negative  (NEGATIVE)   04/07/18  11:06    


 


Stl C. diff 027-NAP1-BI  Negative  (NEGATIVE)   04/07/18  11:06    


 


Cryptosporid parvum Ag  Negative  (NEGATIVE)   04/07/18  11:06    


 


E. histolytica Antigen  Negative  (NEGATIVE)   04/07/18  11:06    


 


Giardia lamblia Ag  Negative  (NEGATIVE)   04/07/18  11:06    














- Plan


(1) Abdominal pain


Status: Acute   


Plan: CONTINUE PAIN AND NAUSEA CONTROL.  IV HYDRATION, SMALL BOWEL SERIES 

ORDERED THIS AM.  PLAN TO ADVANCE DIET AS TOLERATED. REPEAT AM LABS AND KUB   





(2) Acute gastroenteritis


Status: Acute   





(3) Hypokalemia


Status: Acute   





(4) Abdominal pain, vomiting, and diarrhea


Status: Acute

## 2018-04-10 NOTE — RAD
AP abdomen. 



Indication: Abdominal pain



Comparison: 04/09/2018



Findings: Contrast is noted throughout the colon consistent a recent small bowel follow-through. The 
appendix opacifies normally as well. There is no discrete mass identified or filling defect within th
e visualized portions of the colon. Small bowel gas pattern is unremarkable. No free air or pneumatos
is. No abdominal mass identified. Previous cholecystectomy. No acute osseous abnormality.



Impression: No radiographic abnormality within the abdomen or pelvis.







Reported By:Electronically Signed by PAULIE SEARS MD at 4/10/2018 3:23:01 PM

## 2022-11-26 NOTE — DR.GENAD
HPI





- PCP


Primary Care Physician: Diamond Purdy





- Complaint/Symptoms


Chief Complaint Doctors Comments: Patient is complaining of abdominal pain with 

her stomach feeling "full" with diarrhea for the past two days.  Sates she came 

to the emergency room two days ago and was told she had gastroenteritis and she 

took the medicines but continues to have diarrhea 3-4 times nightly with her 

being unable to hold her stool.  States she saw Dr. Tinsley about two months ago 

in [place] and had a colonoscopy with polyps removed.  States her regular 

doctor is Dr. Martinez in [place].  She denies tobacco or alcohol usage.  

States she has been having fever, chills and diarrhea.  states she has been 

having stomach pain that is sharp in the top of her stomach.  States she has 

not been able to keep anything down today and she tried soup and it came back 

up along with juices.  She denies hematuria or denis.


Chief Complaint:: Stomach


Self Treatment fo Chief Complaint: Prescribe meds from previous visit.





- Nurses notes reviewed


Nurses Notes Review: Yes





- Source


History Provided: Patient





- Mode of Arrival


Mode of Arrival: Ambulatory





- Timing


Onset of Chief Complaint: 18


Came on: Gradually





- Duration


Duration: Intermittent


How lon


Duration: Days





- Location


Location: epigastric pain





- Severity


Severity: Moderate





- Modifying Factors


Worsens:: eating


Improves:: nothing





PMH





- PMH


Past Medical History: Yes


Past Medical History: Diabetes, Hypertension


Past Surgical History: Yes


Surgical History: Cholecystectomy, Hysterectomy, Thyroidectomy





- Family History


History of Family Medical Conditions: Yes


Family Medical History: Diabetes Mellitus, Hypertension





- Social History


Does patient currently use any type of tobacco product: No


Have you used tobacco products in the last 12 months: No


Type of Tobacco Use: None


Does any household member use tobacco: No


Do you use any recreational Drugs:: No


Lives With: Family


Lives Where: Home





- infectious screening


In the last 2 months have you had wt loss of >10#?: NO


Have you had fever, night sweats or hemotysis?: No


Have you traveled outside the country in the last 6 months?: No


Isolation: Standard





ROS





- Review of Systems


Constitutional: No Symptoms Reported, Chills, Fever, Weakness, Loss of 

Appetite.  negative: See HPI, Diaphoresis, Malaise, Irritable, Fatigue, Other


Eyes: No Symptoms Reported.  negative: See HPI, Eye Pain, Blurred Vision, 

Tearing, Discharge, Photophobia, Diplopia, Other


ENTM: No Symptoms Reported.  negative: See HPI, Ear Pain, Ear Discharge, 

Pulling on Ears, Hearing Loss, Nose Pain, Nose Discharge, Epistaxis, Nose 

Congestion, Mouth Pain, Mouth Swelling, Loose Teeth, Drooling, Throat Pain, 

Throat Swelling, Ear Foreign Body


Respiratoy: No Symptoms Reported.  negative: See HPI, Productive Cough, Non-

Productive Cough, Moist Cough, Dry Cough, Hacking Cough, Barking Cough, Brassy 

Cough, Orthopnea, Short of Breath, Stridor, Wheezing, Hemoptysis, Other


Cardiovascular: No Symptoms Reported.  negative: See HPI, Chest Pain, Edema, 

Palpitations, Syncope, Cyanosis, Skin Mottling, Other


Gastrointestinal/Abdominal: No Symptoms Reported, Abdominal Pain, Diarrhea, 

Nausea, Vomiting.  negative: See HPI, Constipation, Food Intolerance, Other


Genitourinary: No Symptoms Reported.  negative: See HPI, Discharge, Dysuria, 

Frequency, Hematuria, Pain, Bleeding, Other


Neurological: No Symptoms Reported.  negative: See HPI, Anxiety, Depressed, 

Emotional Problems, Headache, Numbness, Paresthesia, Pre-existing Deficit, 

Seizure, Tingling, Tremors, Weakness, Dizziness, Problems Walking, Speech 

Problem, Other


Musculoskeletal: No Symptoms Reported


Integumentary: No Symptoms Reported.  negative: See HPI, Change in Color, 

Change in Hair/Nails, Dryness, Lesions, Lumps, Rash, Itching, Wound, Bruises, 

Juandice, Other


Hematologic/Lymphatic: No Symptoms Reported.  negative: See HPI, Anemia, Blood 

Clots, Easy Bleeding, Easy Bruising, Swollen Glands, Lymphadenopathy, Other


Endocrine: No Symptoms Reported, Decreased Appetite.  negative: See HPI, 

Excessive Sweating, Flushing, Intolerance to Cold, Intolerance to Heat, 

Increased Hunger, Increased Thirst, Increased Urine, Unexplained Weight Gain, 

Unexplained Weight Loss, Failure to Thrive, Other


Psychiatric: No Symptoms Reported.  negative: See HPI, Anxiety, Depression, 

Hallucinations, Excessive crying, Suicidal, Other





PE





- Vital Signs


Vitals: 


 





Temperature                      98.1 F


Pulse Rate                       92


Respiratory Rate                 16


Blood Pressure [Right Arm]       195/89


Blood Pressure [Left Arm]        186/76


Blood Pressure                   145/79


O2 Sat by Pulse Oximetry         99











- General


Limitations: No Limitations


General Appearance: Alert, In Distress (mild), Obese





- Head


Head Exam: Normal Inspection, Atraumatic, Normocephalic





- Eyes


Eye exam: Normal Appearance, PERRL, EOMI.  negative: Scleral Icterus, 

Conjunctival Injection, Nystagmus, Miosis, Mydrasis, Periorbital Swelling, 

Periorbital Tenderness, Other





- ENT


ENT Exam: Normal Exam, Normal Oropharynx, Normal  External Ear Exam, Mucous 

Membranes Moist, TM's Normal Bilaterally


External Ear Exam: Normal External Inspection


TM/Canal Exam: Bilateral Normal


Nose Exam: Normal Nose Exam


Mouth Exam: Normal Inspection.  negative: Drooling, Trismus, Lip Swelling, 

Tongue Elevation, Tongue Swelling, Laceration, Other


Throat Exam: Normal Inspection.  negative: Tonsillar Erythema, Tonsillomegaly, 

Tonsillar Exudate, R Peritonsillar Mass, L Peritonsillar Mass, Muffled Voice, 

Other





- Neck


Neck Exam: Normal Inspection, Full ROM, Trachea Midline





- Chest


Chest Inspection: Normal Inspection, Symmetric Chest Wall Rise.  negative: 

Tenderness, Rash, Abscess, Other





- Respiratory


Respiratory Exam: Normal Lung Sounds Bilat.  negative: Accessory Muscle Use, 

Chest Wall Tenderness, Prolonged Expiratory Phase, Respiratory Distress, Stridor

, Other


Respiratory Exam: Bilateral Clear to Auscultation





- Cardiovascular


Cardiovascular Exam: Regular Rate, Normal Rhythm, Normal Heart Sounds.  negative

: Bradycardia, Tachycardia, Irregular Rhythm, Systolic Murmur, Diastolic Murmur

, Rubs, Gallop, Clicks, JVD, +S1, +S2, +S3, +S4, Other





- Abdominal Exam


Abdominal Exam: Normal Inspection, Normal Bowel Sounds, Soft, Distention, 

Tenderness (epigastric tenderness), Hyperactive Bowel Sounds


Abdominal Tenderness: Epigastrium, Moderate





- Extremities


Extremities Exam: Normal Inspection, Full ROM, Normal Capillary Refill.  

negative: Tenderness, Edema, Joint Swelling, Calf Tenderness, Other





- Back


Back Exam: Normal Inspection, Full ROM.  negative: Tenderness, (R) CVA 

Tenderness, (L) CVA Tenderness, Muscle Spasm, Paraspinal Tenderness, Vertebral 

Tenderness, Rashes, (R) Sciatic Notch Tenderness, (L) Sciatic Notch Tendern, (R

) Straight Leg Raise, (L) Straight Leg Raise, Other





- Neurologic


Neurological Exam: Alert, Oriented X3, CN II-XII Intact, Reflexes Normal.  

negative: Normal Gait (gait not tested)





- Psychiatric


Psychiatric Exam: Normal Affect, Normal Mood





- Skin


Skin Exam: Warm, Dry, Intact, Normal Color





Course





- Reevaluation


1st: Improved





- Consultation


Called: 07:41


Call Returned: 07:53 (Dr. Bashir to admit)





- Education/Counseling


Education/Counseling: Patient


Educated On: Treatment, Diagnosis, Needs for Follow Up





ROR





- Labs Reviewed


Laboratory Results Reviewed?: Yes (All labs and x-ray results reviewed and 

discussed with patient)


Result Diagrams: 


 18 05:28





 18 05:28


Laboratory: 


 











WBC  10.4 X10^3/uL (3.6-10.0)  H  18  05:28    


 


RBC  5.47 X10^6/uL (3.5-5.4)  H  18  05:28    


 


Hgb  14.2 g/dL (12.0-16.0)   18  05:28    


 


Hct  43.4 % (36.0-47.0)   18  05:28    


 


MCV  79.4 fL (80.0-100.0)  L  18  05:28    


 


MCH  26.0 pg (27.0-34.0)  L  18  05:28    


 


MCHC  32.8 g/dL (33.0-35.0)  L  18  05:28    


 


RDW  14.3 % (11.6-16.5)   18  05:28    


 


Plt Count  203 X10^3/uL (150.0-450.0)   18  05:28    


 


MPV  10.2 fL (7.4-11.0)   18  05:28    


 


Neut % (Auto)  72.2 % (42.0-75.0)   18  05:28    


 


Lymph % (Auto)  20.8 % (21.0-51.0)  L  18  05:28    


 


Mono % (Auto)  4.4 % (0.0-13.0)   18  05:28    


 


Eos % (Auto)  0.5 % (0.9-2.9)  L  18  05:28    


 


Baso % (Auto)  2.1 % (0.2-1.0)  H  18  05:28    


 


Neut # (Auto)  7.5 x10^3/uL (2.2-4.8)  H  18  05:28    


 


Lymph # (Auto)  2.2 X10^3/uL (1.3-2.9)   18  05:28    


 


Mono # (Auto)  0.5 x10^3/uL (0.3-0.8)   18  05:28    


 


Eos # (Auto)  0.1 x10^3/uL (0.0-0.2)   18  05:28    


 


Baso # (Auto)  0.2 X10^3/uL (0.0-0.1)  H  18  05:28    


 


Absolute Nucleated RBC  0.0 /100WBC  18  05:28    


 


Sodium  141 mmol/L (136-145)   18  05:28    


 


Corrected Sodium  142 mmol/L (136-145)   18  05:28    


 


Potassium  3.0 mmol/L (3.5-5.1)  L*  18  05:28    


 


Chloride  109 mmol/L ()  H  18  05:28    


 


Carbon Dioxide  19.9 mmol/L (21-32)  L  18  05:28    


 


BUN  28 mg/dL (7-18)  H  18  05:28    


 


Creatinine  1.02 mg/dL (0.55-1.02)   18  05:28    


 


Est GFR (MDRD) Af Amer  > 60  (>60)   18  05:28    


 


Est GFR (MDRD) Non-Af  60  (>60)   18  05:28    


 


Glucose  151 mg/dL (65-99)  H  18  05:28    


 


Calcium  8.7 mg/dL (8.5-10.1)   18  05:28    


 


Corrected Calcium  TNP   18  05:28    


 


Total Bilirubin  0.30 mg/dL (0.2-1.0)   18  05:28    


 


AST  11 Units/L (15-37)  L  18  05:28    


 


ALT  17 Units/L (12-78)   18  05:28    


 


Alkaline Phosphatase  106 Units/L ()   18  05:28    


 


Total Protein  7.7 g/dL (6.4-8.2)   18  05:28    


 


Albumin  3.5 g/dL (3.4-5.0)   18  05:28    


 


Globulin  4.2 g/dL (2.5-4.5)   18  05:28    


 


Albumin/Globulin Ratio  0.8 Ratio (1.1-2.1)  L  18  05:28    


 


Amylase  58 Units/L ()   18  05:28    


 


Lipase  123 Units/L ()   18  05:28    


 


HCG, Qual  Negative <10 mIU/mL  18  05:28    


 


Specimen Type  Clean catch urine   18  05:52    


 


Urine Color  Yellow  (YELLOW)   18  05:52    


 


Urine Appearance  Slightly hazy  (CLEAR)   18  05:52    


 


Urine pH  6.0  (5.0 - 8.0)   18  05:52    


 


Ur Specific Gravity  1.020  (1.000-1.030)   18  05:52    


 


Urine Protein  2+  (NEGATIVE)   18  05:52    


 


Urine Glucose (UA)  Negative  (NEGATIVE)   18  05:52    


 


Urine Ketones  Negative  (NEGATIVE)   18  05:52    


 


Urine Occult Blood  Negative  (NEGATIVE)   18  05:52    


 


Urine Nitrite  Negative  (NEGATIVE)   18  05:52    


 


Urine Bilirubin  Negative  (NEGATIVE)   18  05:52    


 


Urine Urobilinogen  Normal  (NORMAL)   18  05:52    


 


Ur Leukocyte Esterase  Negative  (NEGATIVE)   18  05:52    


 


Urine RBC  None seen /HPF (NONE SEEN)   18  05:52    


 


Urine WBC  0-2 /HPF (NONE SEEN)   18  05:52    


 


Ur Squamous Epith Cells  Few /HPF (NEGATIVE)   18  05:52    


 


Urine Bacteria  Trace /HPF (NEGATIVE)   18  05:52    


 


Granular Casts  Few /LPF (NEGATIVE)   18  05:52    


 


Coarse Granular Casts  Moderate /HPF (NEGATIVE)   18  05:52    


 


Ur Culture Indicated?  No/not indicated   18  05:52    














- XRAY


XRAY Interpreted by: Radiologist (CT abdomen: Developing small bowel 

obstruction suspected. Bowel is minimally inflamed, and inflammatory bowel 

disease such as Crohn's disease is possible. Infectgious enteritis is possible.)





- Diagnosis


Discharge Problem: 


 Acute gastroenteritis, Hypokalemia, Metabolic acidosis, abdominal pain r/o SBO

, Hyperglycemia








- Discharge Plan


Disposition:  ADMITTED AS INPATIENT


Condition: Stable





- Follow ups/Referrals


Follow ups/Referrals: 


DIAMOND PURDY [Primary Care Provider] - 3 days





- Instructions
independent/needs device